# Patient Record
Sex: FEMALE | Race: ASIAN | NOT HISPANIC OR LATINO | Employment: FULL TIME | URBAN - METROPOLITAN AREA
[De-identification: names, ages, dates, MRNs, and addresses within clinical notes are randomized per-mention and may not be internally consistent; named-entity substitution may affect disease eponyms.]

---

## 2022-06-13 ENCOUNTER — OFFICE VISIT (OUTPATIENT)
Dept: URGENT CARE | Facility: PHYSICIAN GROUP | Age: 32
End: 2022-06-13
Payer: COMMERCIAL

## 2022-06-13 ENCOUNTER — HOSPITAL ENCOUNTER (OUTPATIENT)
Facility: MEDICAL CENTER | Age: 32
End: 2022-06-13
Attending: PHYSICIAN ASSISTANT
Payer: COMMERCIAL

## 2022-06-13 VITALS
HEART RATE: 80 BPM | HEIGHT: 60 IN | RESPIRATION RATE: 16 BRPM | OXYGEN SATURATION: 97 % | TEMPERATURE: 97.7 F | BODY MASS INDEX: 26.5 KG/M2 | DIASTOLIC BLOOD PRESSURE: 70 MMHG | WEIGHT: 135 LBS | SYSTOLIC BLOOD PRESSURE: 108 MMHG

## 2022-06-13 DIAGNOSIS — N30.01 ACUTE CYSTITIS WITH HEMATURIA: ICD-10-CM

## 2022-06-13 LAB
APPEARANCE UR: CLEAR
BILIRUB UR STRIP-MCNC: NEGATIVE MG/DL
COLOR UR AUTO: NORMAL
GLUCOSE UR STRIP.AUTO-MCNC: NEGATIVE MG/DL
KETONES UR STRIP.AUTO-MCNC: NEGATIVE MG/DL
LEUKOCYTE ESTERASE UR QL STRIP.AUTO: NORMAL
NITRITE UR QL STRIP.AUTO: NEGATIVE
PH UR STRIP.AUTO: 7 [PH] (ref 5–8)
PROT UR QL STRIP: NEGATIVE MG/DL
RBC UR QL AUTO: NORMAL
SP GR UR STRIP.AUTO: 0.01
UROBILINOGEN UR STRIP-MCNC: 0.2 MG/DL

## 2022-06-13 PROCEDURE — 99203 OFFICE O/P NEW LOW 30 MIN: CPT | Performed by: PHYSICIAN ASSISTANT

## 2022-06-13 PROCEDURE — 81002 URINALYSIS NONAUTO W/O SCOPE: CPT | Performed by: PHYSICIAN ASSISTANT

## 2022-06-13 PROCEDURE — 87077 CULTURE AEROBIC IDENTIFY: CPT

## 2022-06-13 PROCEDURE — 87186 SC STD MICRODIL/AGAR DIL: CPT

## 2022-06-13 PROCEDURE — 87086 URINE CULTURE/COLONY COUNT: CPT

## 2022-06-13 RX ORDER — NITROFURANTOIN 25; 75 MG/1; MG/1
100 CAPSULE ORAL 2 TIMES DAILY
Qty: 10 CAPSULE | Refills: 0 | Status: SHIPPED | OUTPATIENT
Start: 2022-06-13 | End: 2022-06-18

## 2022-06-14 DIAGNOSIS — N30.01 ACUTE CYSTITIS WITH HEMATURIA: ICD-10-CM

## 2022-06-14 ASSESSMENT — ENCOUNTER SYMPTOMS
VOMITING: 0
DIARRHEA: 0
FEVER: 0
COUGH: 0
SHORTNESS OF BREATH: 0
CHILLS: 0
MYALGIAS: 0
ABDOMINAL PAIN: 0
CONSTIPATION: 0
EYE PAIN: 0
NAUSEA: 0
HEADACHES: 0
SORE THROAT: 0

## 2022-06-14 NOTE — PROGRESS NOTES
Subjective:   Christine Wilks is a 32 y.o. female who presents for UTI (X 4 days)      ELMIRA  Is a pleasant 32-year-old female who has a 4-day history of dysuria, frequency and urgency.  These are typical symptoms for her with a urinary tract infection.  She is not noticed any discharge, bleeding, rash  Review of Systems   Constitutional: Negative for chills and fever.   HENT: Negative for congestion, ear pain and sore throat.    Eyes: Negative for pain.   Respiratory: Negative for cough and shortness of breath.    Cardiovascular: Negative for chest pain.   Gastrointestinal: Negative for abdominal pain, constipation, diarrhea, nausea and vomiting.   Genitourinary: Positive for dysuria, frequency and urgency.   Musculoskeletal: Negative for myalgias.   Skin: Negative for rash.   Neurological: Negative for headaches.       Medications, Allergies, and current problem list reviewed today in Epic.     Objective:     /70 (BP Location: Left arm, Patient Position: Sitting, BP Cuff Size: Adult)   Pulse 80   Temp 36.5 °C (97.7 °F) (Temporal)   Resp 16   Ht 1.524 m (5')   Wt 61.2 kg (135 lb)   SpO2 97%     Physical Exam  Vitals reviewed.   Constitutional:       Appearance: Normal appearance.   HENT:      Head: Normocephalic and atraumatic.      Right Ear: External ear normal.      Left Ear: External ear normal.      Nose: Nose normal.      Mouth/Throat:      Mouth: Mucous membranes are moist.   Eyes:      Conjunctiva/sclera: Conjunctivae normal.   Cardiovascular:      Rate and Rhythm: Normal rate.   Pulmonary:      Effort: Pulmonary effort is normal.   Abdominal:      Tenderness: There is no right CVA tenderness or left CVA tenderness.   Skin:     General: Skin is warm and dry.      Capillary Refill: Capillary refill takes less than 2 seconds.   Neurological:      Mental Status: She is alert and oriented to person, place, and time.         Lab Results/POC Test Results   Results for orders placed or performed  in visit on 06/13/22   POCT Urinalysis   Result Value Ref Range    POC Color Light Yellow Negative    POC Appearance Clear Negative    POC Leukocyte Esterase Small Negative    POC Nitrites Negative Negative    POC Urobiligen 0.2 Negative (0.2) mg/dL    POC Protein Negative Negative mg/dL    POC Urine PH 7.0 5.0 - 8.0    POC Blood Trace-Intact Negative    POC Specific Gravity 0.015 <1.005 - >1.030    POC Ketones Negative Negative mg/dL    POC Bilirubin Negative Negative mg/dL    POC Glucose Negative Negative mg/dL           Assessment/Plan:     Diagnosis and associated orders:     1. Acute cystitis with hematuria  POCT Urinalysis    URINE CULTURE(NEW)    nitrofurantoin (MACROBID) 100 MG Cap      Comments/MDM:     • No signs of urinary tract infection.  Appears to be a noncomplicated lower urinary tract infection she is a good candidate for Macrobid.  We will send out urine culture and contact the patient only if we have indication to alter antimicrobial therapy.  • Additionally I have considered several alternative diagnosis for dysuria including but not limited to urethritis, skin condition (irritant contact dermatitis, herpes), vaginitis, interstitial cystitis, and pyelonephritis.  •          Differential diagnosis, natural history, supportive care, and indications for immediate follow-up discussed.    Advised the patient to follow-up with the primary care physician for recheck, reevaluation, and consideration of further management.    Please note that this dictation was created using voice recognition software. I have made a reasonable attempt to correct obvious errors, but I expect that there are errors of grammar and possibly content that I did not discover before finalizing the note.    This note was electronically signed by Sukhwinder Morris PA-C

## 2022-06-16 LAB
BACTERIA UR CULT: ABNORMAL
BACTERIA UR CULT: ABNORMAL
SIGNIFICANT IND 70042: ABNORMAL
SITE SITE: ABNORMAL
SOURCE SOURCE: ABNORMAL

## 2023-10-23 ENCOUNTER — INITIAL PRENATAL (OUTPATIENT)
Dept: OBGYN | Facility: CLINIC | Age: 33
End: 2023-10-23
Payer: COMMERCIAL

## 2023-10-23 VITALS — SYSTOLIC BLOOD PRESSURE: 127 MMHG | WEIGHT: 163 LBS | BODY MASS INDEX: 31.83 KG/M2 | DIASTOLIC BLOOD PRESSURE: 72 MMHG

## 2023-10-23 DIAGNOSIS — Z34.92 SECOND TRIMESTER PREGNANCY: ICD-10-CM

## 2023-10-23 DIAGNOSIS — Z34.92 NORMAL PREGNANCY, SECOND TRIMESTER: ICD-10-CM

## 2023-10-23 PROCEDURE — 0500F INITIAL PRENATAL CARE VISIT: CPT | Performed by: OBSTETRICS & GYNECOLOGY

## 2023-10-23 PROCEDURE — 3078F DIAST BP <80 MM HG: CPT | Performed by: OBSTETRICS & GYNECOLOGY

## 2023-10-23 PROCEDURE — 3074F SYST BP LT 130 MM HG: CPT | Performed by: OBSTETRICS & GYNECOLOGY

## 2023-10-23 NOTE — PROGRESS NOTES
CC: New Ob visit    Subjective Ms. Christine Wilks  23w6d.     Ultrasound fone on 23 demonstrated 9w6d with KASHIF of 24.   LMP sometime early May.   Pap done in  and WNL. States she has had labs done.   23w6d today.     She denies h/o oral or genital HSV herself or her sexual partner.     denies personal or FOB family history of genetic abnormalities, congenital abnormalities or mental retardation.  reports personal history of chickenpox or varicella immunization.  denies cats at home.  Does not work with children.    ROS:  gen: denies general concerns  CV/resp: denies history of cardiac/respiratory issues.  abd: denies nausea/vomiting.  Denies abd pain.  GYN: reports fetal movement, rdenies vaginal bleeding, denies leakage of fluid, denies contractions.       I have reviewed the patients' medical, surgical, gynecological, obstetrical, social, and family histories, medications and available lab results and pertinent notes are as follows:    OB History    Para Term  AB Living   1 0 0 0 0 0   SAB IAB Ectopic Molar Multiple Live Births   0 0 0 0 0 0       Past Gynecological History:    Denies fibroids, ovarian cysts, abnormal pap smears, STDs. She denies ever having surgery on her cervix, uterus, or ovaries in the past. Last pap smear was      History reviewed. No pertinent past medical history.    History reviewed. No pertinent surgical history.    Social History     Socioeconomic History    Marital status: Single     Spouse name: Not on file    Number of children: Not on file    Years of education: Not on file    Highest education level: Not on file   Occupational History    Not on file   Tobacco Use    Smoking status: Never    Smokeless tobacco: Never   Vaping Use    Vaping Use: Never used   Substance and Sexual Activity    Alcohol use: Not Currently     Alcohol/week: 0.6 oz     Types: 1 Shots of liquor per week    Drug use: Never    Sexual activity: Yes     Partners: Male    Other Topics Concern    Not on file   Social History Narrative    Not on file     Social Determinants of Health     Financial Resource Strain: Not on file   Food Insecurity: Not on file   Transportation Needs: Not on file   Physical Activity: Not on file   Stress: Not on file   Social Connections: Not on file   Intimate Partner Violence: Not on file   Housing Stability: Not on file       Family History:   Family History   Problem Relation Age of Onset    Cancer Mother     Cancer Maternal Aunt     Diabetes Paternal Aunt        Genetic Screening/Teratology Counseling- Includes patient, baby's father, or anyone in either family with:  Patient's age 35 years or older as of estimated date of delivery: No     Thalassemia (Italian, Greek, Mediterranean, or  background): MCV less than 80: No     Neural tube defect (Meningomyelocele, Spina bifida, or Anencephaly): No     Congenital heart defect: No     Down syndrome: No     Tobi-Sachs (Ashkenazi Episcopal, Cajun, Chinese Kuwaiti): No     Canavan disease (Ashkenazi Episcopal): No     Familial dysautonomia (Ashkenazi Episcopal): No     Sickle cell disease or trait (): No     Hemophilia or other blood disorders: No     Muscular dystrophy: No    Cystic fibrosis: No     [place]'s chorea: No     Mental retardation/autism: No     Other inherited genetic or chromosomal disorder: No     Maternal metabolic disorder (eg. Type 1 diabetes, PKU): No     Patient or baby's father had child with birth defects not listed above: No     Recurrent pregnancy loss, or a stillbirth: No     Medications (including supplements, vitamins, herbs, or OTC drugs)/illicit/recreational drugs/alcohol since last menstrual period: No               Infection Prevention  1. High Risk For HIV: No 6. Rash Or Illness Since LMP: No     2. High Risk For Hepatitis B or C: No 7. History Of STD, GC, Chlamydia, HPV Syphilis: No     3. Live With Someone With TB Or Exposed To TB: No 8. Have a cat in the home?: No      4. Patient Or Partner Has A History Of Herpes: No 8a. Responsible for changing the litter?: No     5. History of Chicken Pox: Yes 9. Other (See Comments Below): No            Allergies: Patient has no known allergies.    Objective:/72   Wt 163 lb     Objective:   Vitals:    10/23/23 0901   BP: 127/72       Prenatal Physical:  General Exam:  HEENT: normal    Heart: normal    Thyroid: normal    Lungs: normal    Lymph Nodes: normal    Neurological: normal    Abdomen: normal    Skin: normal    Extremities: normal    Pelvic Exam:      Lab: No results found for this or any previous visit (from the past 672 hour(s)).    Ultrasound: Reviewed initial scan and KASHIF is by US    Assessment:    --Normal Exam at 23 weeks- measuring 27 weeks by fundal height. Anatomy scan ordered today. GTT, CBC, and RPR ordered today.     Plan:    Reviewed the patients risk factors for this pregnancy and recommend the need for prenatal care    Genetic screening was discussed with literature on Prenatal Screening, Cystic Fibrosis, and SMA provided and the patient plans to - already did [id number]    Discuss importance of water intake, controlled caloric intake, eating 5 small meals throughout the day to maintain blood sugar and taking PNV    If has nausea, take Vitamin B6 25mg TID with doxylamine/Unisom 25mg at night    Discuss importance of exercise, as well as rest    Lab slip for Prenatal labs (if not already completed)    Discuss policy for OB care at [facility]     Follow up in 4 weeks for next visit

## 2023-10-23 NOTE — PROGRESS NOTES
Patient here for NEW OB *Dr. Cowart pt*  LMP= per pt early may   KASHIF= 2/12/24  GA= 23w6d  Last pap- per pt got it done 7/17/23 wnl   Phone number:[phone] (home)   Pharmacy verified

## 2023-11-15 ENCOUNTER — HOSPITAL ENCOUNTER (OUTPATIENT)
Dept: RADIOLOGY | Facility: MEDICAL CENTER | Age: 33
End: 2023-11-15
Attending: OBSTETRICS & GYNECOLOGY
Payer: COMMERCIAL

## 2023-11-15 DIAGNOSIS — Z34.92 SECOND TRIMESTER PREGNANCY: ICD-10-CM

## 2023-11-15 DIAGNOSIS — Z34.92 NORMAL PREGNANCY, SECOND TRIMESTER: ICD-10-CM

## 2023-11-15 PROCEDURE — 76805 OB US >/= 14 WKS SNGL FETUS: CPT

## 2023-11-20 ENCOUNTER — ROUTINE PRENATAL (OUTPATIENT)
Dept: OBGYN | Facility: CLINIC | Age: 33
End: 2023-11-20
Payer: COMMERCIAL

## 2023-11-20 VITALS — WEIGHT: 170 LBS | DIASTOLIC BLOOD PRESSURE: 75 MMHG | SYSTOLIC BLOOD PRESSURE: 124 MMHG | BODY MASS INDEX: 33.2 KG/M2

## 2023-11-20 DIAGNOSIS — Z34.03 PRIMIGRAVIDA IN THIRD TRIMESTER: Primary | ICD-10-CM

## 2023-11-20 PROCEDURE — 3074F SYST BP LT 130 MM HG: CPT | Performed by: OBSTETRICS & GYNECOLOGY

## 2023-11-20 PROCEDURE — 0502F SUBSEQUENT PRENATAL CARE: CPT | Performed by: OBSTETRICS & GYNECOLOGY

## 2023-11-20 PROCEDURE — 3078F DIAST BP <80 MM HG: CPT | Performed by: OBSTETRICS & GYNECOLOGY

## 2023-11-20 NOTE — PROGRESS NOTES
Pt is here for an OB FV  Pt states +FM  No VB, LOF or UC's  K/C was given and explained  Tdap is declined today but will think about it for the next visit  Flu declined  Pt states she has insomnia and would like to know about supplementation

## 2023-11-20 NOTE — LETTER
"Count Your Baby's Movements  Another step to a healthy delivery  Christine Silvermanloyd             Dept: [phone]    How Many Weeks Pregnant? 27w6d    Date to Begin Countin23              How to use this chart    One way for your physician to keep track of your baby's health is by knowing how often the baby moves (or \"kicks\") in your womb.  You can help your physician to do this by using this chart every day.    Every day, you should see how many hours it takes for your baby to move 10 times.  Start in the morning, as soon as you get up.    First, write down the time your baby moves until you get to 10.  Check off one box every time your baby moves until you get to 10.  Write down the time you finished counting in the last column.  Total how long it took to count up all 10 movements.  Finally, fill in the box that shows how long this took.  After counting 10 movements, you no longer have to count any more that day.  The next morning, just start counting again as soon as you get up.    What should you call a \"movement\"?  It is hard to say, because it will feel different from one mother to another and from one pregnancy to the next.  The important thing is that you count the movements the same way throughout your pregnancy.  If you have more questions, you should ask your physician.    Count carefully every day!  SAMPLE:  Week 28    How many hours did it take to feel 10 movements?       Start  Time     1     2     3     4     5     6     7     8     9     10   Finish Time   Mon 8:20           11:40                  Fri               Sat               Sun                 IMPORTANT: You should contact your physician if it takes more than two hours for you to feel 10 movements.  Each morning, write down the time and start to count the movements of your baby.  Keep track by checking off one box every time you feel one movement.  When you have felt 10 \"kicks\", write down the " time you finished counting in the last column.  Then fill in the   box (over the check stanley) for the number of hours it took.  Be sure to read the complete instructions on the previous page.

## 2023-11-29 ENCOUNTER — HOSPITAL ENCOUNTER (OUTPATIENT)
Dept: LAB | Facility: MEDICAL CENTER | Age: 33
End: 2023-11-29
Attending: OBSTETRICS & GYNECOLOGY
Payer: COMMERCIAL

## 2023-11-29 DIAGNOSIS — Z34.92 NORMAL PREGNANCY, SECOND TRIMESTER: ICD-10-CM

## 2023-11-29 LAB
BASOPHILS # BLD AUTO: 0.5 % (ref 0–1.8)
BASOPHILS # BLD: 0.06 K/UL (ref 0–0.12)
EOSINOPHIL # BLD AUTO: 0.11 K/UL (ref 0–0.51)
EOSINOPHIL NFR BLD: 0.9 % (ref 0–6.9)
ERYTHROCYTE [DISTWIDTH] IN BLOOD BY AUTOMATED COUNT: 44 FL (ref 35.9–50)
HCT VFR BLD AUTO: 38.9 % (ref 37–47)
HGB BLD-MCNC: 13.1 G/DL (ref 12–16)
IMM GRANULOCYTES # BLD AUTO: 0.27 K/UL (ref 0–0.11)
IMM GRANULOCYTES NFR BLD AUTO: 2.1 % (ref 0–0.9)
LYMPHOCYTES # BLD AUTO: 1.78 K/UL (ref 1–4.8)
LYMPHOCYTES NFR BLD: 13.9 % (ref 22–41)
MCH RBC QN AUTO: 31.3 PG (ref 27–33)
MCHC RBC AUTO-ENTMCNC: 33.7 G/DL (ref 32.2–35.5)
MCV RBC AUTO: 93.1 FL (ref 81.4–97.8)
MONOCYTES # BLD AUTO: 1.02 K/UL (ref 0–0.85)
MONOCYTES NFR BLD AUTO: 8 % (ref 0–13.4)
NEUTROPHILS # BLD AUTO: 9.58 K/UL (ref 1.82–7.42)
NEUTROPHILS NFR BLD: 74.6 % (ref 44–72)
NRBC # BLD AUTO: 0 K/UL
NRBC BLD-RTO: 0 /100 WBC (ref 0–0.2)
PLATELET # BLD AUTO: 251 K/UL (ref 164–446)
PMV BLD AUTO: 10.2 FL (ref 9–12.9)
RBC # BLD AUTO: 4.18 M/UL (ref 4.2–5.4)
WBC # BLD AUTO: 12.8 K/UL (ref 4.8–10.8)

## 2023-11-29 PROCEDURE — 85025 COMPLETE CBC W/AUTO DIFF WBC: CPT

## 2023-11-29 PROCEDURE — 82950 GLUCOSE TEST: CPT

## 2023-11-29 PROCEDURE — 36415 COLL VENOUS BLD VENIPUNCTURE: CPT

## 2023-11-29 PROCEDURE — 86780 TREPONEMA PALLIDUM: CPT

## 2023-11-30 LAB
GLUCOSE 1H P 50 G GLC PO SERPL-MCNC: 104 MG/DL (ref 70–139)
T PALLIDUM AB SER QL IA: NORMAL

## 2023-12-07 ENCOUNTER — ROUTINE PRENATAL (OUTPATIENT)
Dept: OBGYN | Facility: CLINIC | Age: 33
End: 2023-12-07
Payer: COMMERCIAL

## 2023-12-07 VITALS — BODY MASS INDEX: 33.73 KG/M2 | WEIGHT: 172.7 LBS | DIASTOLIC BLOOD PRESSURE: 80 MMHG | SYSTOLIC BLOOD PRESSURE: 138 MMHG

## 2023-12-07 DIAGNOSIS — Z34.93 THIRD TRIMESTER PREGNANCY: ICD-10-CM

## 2023-12-07 PROCEDURE — 3075F SYST BP GE 130 - 139MM HG: CPT | Performed by: OBSTETRICS & GYNECOLOGY

## 2023-12-07 PROCEDURE — 3079F DIAST BP 80-89 MM HG: CPT | Performed by: OBSTETRICS & GYNECOLOGY

## 2023-12-07 PROCEDURE — 0502F SUBSEQUENT PRENATAL CARE: CPT | Performed by: OBSTETRICS & GYNECOLOGY

## 2023-12-07 NOTE — PROGRESS NOTES
Pt's here for OB follow-up  Reports + fetal movement good/active  No VB, LOF or UC's.  Confirmed good ph#, pharmacy, drug allergy, and medications on file.  Pt states no complaints for today.  Pt declines Influenza & TDAP this pregnancy.

## 2023-12-21 ENCOUNTER — HOSPITAL ENCOUNTER (EMERGENCY)
Facility: MEDICAL CENTER | Age: 33
End: 2023-12-21
Attending: OBSTETRICS & GYNECOLOGY | Admitting: OBSTETRICS & GYNECOLOGY
Payer: COMMERCIAL

## 2023-12-21 VITALS
TEMPERATURE: 97.4 F | DIASTOLIC BLOOD PRESSURE: 75 MMHG | SYSTOLIC BLOOD PRESSURE: 122 MMHG | OXYGEN SATURATION: 97 % | HEART RATE: 93 BPM

## 2023-12-21 LAB
APPEARANCE UR: CLEAR
COLOR UR AUTO: YELLOW
CRYSTALS AMN MICRO: NORMAL
GLUCOSE UR QL STRIP.AUTO: NEGATIVE MG/DL
KETONES UR QL STRIP.AUTO: ABNORMAL MG/DL
LEUKOCYTE ESTERASE UR QL STRIP.AUTO: NEGATIVE
NITRITE UR QL STRIP.AUTO: NEGATIVE
PH UR STRIP.AUTO: 7 [PH] (ref 5–8)
PROT UR QL STRIP: NEGATIVE MG/DL
RBC UR QL AUTO: ABNORMAL
SP GR UR STRIP.AUTO: 1.01 (ref 1–1.03)

## 2023-12-21 PROCEDURE — 59025 FETAL NON-STRESS TEST: CPT

## 2023-12-21 PROCEDURE — 81002 URINALYSIS NONAUTO W/O SCOPE: CPT

## 2023-12-21 PROCEDURE — 99282 EMERGENCY DEPT VISIT SF MDM: CPT | Performed by: ADVANCED PRACTICE MIDWIFE

## 2023-12-21 PROCEDURE — 89060 EXAM SYNOVIAL FLUID CRYSTALS: CPT

## 2023-12-21 PROCEDURE — 99284 EMERGENCY DEPT VISIT MOD MDM: CPT

## 2023-12-21 ASSESSMENT — PAIN SCALES - GENERAL: PAINLEVEL: 0 - NO PAIN

## 2023-12-22 ENCOUNTER — ROUTINE PRENATAL (OUTPATIENT)
Dept: OBGYN | Facility: CLINIC | Age: 33
End: 2023-12-22
Payer: COMMERCIAL

## 2023-12-22 VITALS — WEIGHT: 176 LBS | DIASTOLIC BLOOD PRESSURE: 81 MMHG | BODY MASS INDEX: 34.37 KG/M2 | SYSTOLIC BLOOD PRESSURE: 131 MMHG

## 2023-12-22 DIAGNOSIS — Z34.03 ENCOUNTER FOR SUPERVISION OF NORMAL FIRST PREGNANCY, THIRD TRIMESTER: ICD-10-CM

## 2023-12-22 PROCEDURE — 3079F DIAST BP 80-89 MM HG: CPT | Performed by: PHYSICIAN ASSISTANT

## 2023-12-22 PROCEDURE — 3075F SYST BP GE 130 - 139MM HG: CPT | Performed by: PHYSICIAN ASSISTANT

## 2023-12-22 PROCEDURE — 0502F SUBSEQUENT PRENATAL CARE: CPT | Performed by: PHYSICIAN ASSISTANT

## 2023-12-22 NOTE — PROGRESS NOTES
S: 33 y.o.  at 32w3d presents for routine obstetric follow-up.   Good fetal movement.  No contractions, vaginal bleeding, or leakage of fluid.    Questions answered.     Pt went to L&D yesterday due to leaking fluids. Lesli neg. Discussed s/sx if ruptured membranes.     O: /81   Wt 176 lb   BMI 34.37 kg/m²   Patients' weight gain, fluid intake and exercise level discussed.  Vitals, fundal height , fetal position, and FHR reviewed on flowsheet    TDaP: Declines, education provided  Flu: Declines, education provided  RSV: Educated on RSV vaccine at 32-36w  Rh: positive    A/P:  33 y.o.  at 32w3d presents for routine obstetric follow-up.  Size equals dates and/or scan    - Continue prenatal vitamins- pills not gummies.  - Fetal kick counts.  - Exercise at least 30 minutes daily. Drink at least 3-4L of water daily  - PTL precautions educated.    Follow-up in 2 weeks.    Sunshine Russell P.A.-C.  [facility]

## 2023-12-22 NOTE — PROGRESS NOTES
- Pt is a  with an EDC of 2/13 making her 32w2d today, with c/o LOF at 1800 that was clear following her shower and trickled down her leg. - VB, +LOF, +FM, - Uc's. VS taken, monitors applied x2, speculum exam performed noting white discharge and no gross pooling of amniotic fluid, fern collected and sent.  - report given to Anette SOUZA   - Anette SOUZA called and given update on pt fern result, provider to come to bedside shortly  - Anette SOUZA bedside  - Anette SOUZA gave this RN d/c orders   - d/c instructions reviewed with pt and FOB, pt acknowledges reasons to return and feels comfortable going home at this time

## 2023-12-22 NOTE — ED PROVIDER NOTES
Emergency Obstetric Consultation     Date of Service  2023    Reason for Consultation  Chief Complaint   Patient presents with    Rupture of Membranes       History of Presenting Illness  33 y.o. female who presented 2023 with Reason for consult: Patient presents to OB ED for complaints of leaking fluid. She reports that she was showering and drying off when she noticed a trickle down her leg. This happened again. When she wiped she did note that the fluid was clear and without odor.  She did have intercourse last evening and withdrawal method was not used. Denies presence of bleeding.     She has not had abdominal pain outside of three days ago. During that time, the pain came in waves throughout the day but resolved spontaneously.   Fetal activity: Perceived fetal activity is normal.    Last perceived fetal movement was within the past hour.    Prenatal complications: no prenatal complications    .    Review of Systems  Review of Systems   All other systems reviewed and are negative.      Obstetric History    OB History    Para Term  AB Living   1             SAB IAB Ectopic Molar Multiple Live Births                    # Outcome Date GA Lbr Chalo/2nd Weight Sex Delivery Anes PTL Lv   1 Current                Gynecologic History  No LMP recorded. Patient is pregnant.    Medical History  No past medical history on file.    Surgical History   has no past surgical history on file.    Family History  family history includes Cancer in her maternal aunt and mother; Diabetes in her paternal aunt.    Social History   reports that she has never smoked. She has never used smokeless tobacco. She reports that she does not currently use alcohol after a past usage of about 0.6 oz of alcohol per week. She reports that she does not use drugs.    Medications  No medications prior to admission.       Allergies  No Known Allergies    Physical Exam  Maternal Exam:  Introitus: Normal vulva. Normal vagina.   "Ferning test: negative.   Nitrazine test: negative.  Baseline rate: 145.   Variability: moderate (6-25 bpm).    Fetal State Assessment: Category I - tracings are normal.  Genitourinary:     General: Normal vulva.         Laboratory               No results for input(s): \"NTPROBNP\" in the last 72 hours.           Assessment & Plan  Assessment:  Not in labor.   Membrane status: intact.   Fetal well-being: normal.   1. 34 yo  with IUP at 32.2 weeks  2. Cat I FHR tracing  3. Leaking fluid    Plan:  1. Fern negative.   2. UA with trace blood likely from intercourse. No nitrates so UTI unlikely. Ketones so patient to orally hydrate.     General prenatal education reviewed with patient including warning signs and symptoms. Encouraged travel to [place] this week after reassuring visit tomorrow in office.           AMALIA Martinez    "

## 2023-12-22 NOTE — DISCHARGE INSTRUCTIONS
Pre-term Labor (<37 weeks):  Call your physician or return to the hospital if:  You have painless regular contractions more than 4 in one hour.  Your water breaks (remember time and color).  You have menstrual-like cramps, a low dull backache or pressure in your pelvis or back.  Your baby does not move enough to complete the daily kick count (10 movements in 2 hours).  Your baby moves much less often than on the days before or you have not felt your baby move all day.  Please review the MEDICATION LIST section of your AFTER VISIT SUMMARY document.  Take your medication as prescribed    What are the signs or symptoms?  Signs of PROM and PPROM include:  A sudden gush of fluid from the vagina.  A slow leak of fluid from the vagina.  Your underwear being wet often.

## 2024-01-02 ENCOUNTER — ROUTINE PRENATAL (OUTPATIENT)
Dept: OBGYN | Facility: CLINIC | Age: 34
End: 2024-01-02
Payer: COMMERCIAL

## 2024-01-02 VITALS — SYSTOLIC BLOOD PRESSURE: 122 MMHG | DIASTOLIC BLOOD PRESSURE: 57 MMHG | BODY MASS INDEX: 34.57 KG/M2 | WEIGHT: 177 LBS

## 2024-01-02 DIAGNOSIS — Z34.03 ENCOUNTER FOR SUPERVISION OF NORMAL FIRST PREGNANCY, THIRD TRIMESTER: ICD-10-CM

## 2024-01-02 PROCEDURE — 3074F SYST BP LT 130 MM HG: CPT | Performed by: STUDENT IN AN ORGANIZED HEALTH CARE EDUCATION/TRAINING PROGRAM

## 2024-01-02 PROCEDURE — 0502F SUBSEQUENT PRENATAL CARE: CPT | Performed by: STUDENT IN AN ORGANIZED HEALTH CARE EDUCATION/TRAINING PROGRAM

## 2024-01-02 PROCEDURE — 3078F DIAST BP <80 MM HG: CPT | Performed by: STUDENT IN AN ORGANIZED HEALTH CARE EDUCATION/TRAINING PROGRAM

## 2024-01-02 NOTE — PROGRESS NOTES
OB F/U  PT REPORTS + FETAL MOVEMENT  DENIES VB, LOF, OR UC'S  PHONE # VERIFIED  PHARMACY VERIFIED    PT REPORTS SPOTTING 1 TIME LAST WEEK SINCE THEN HAS NOT BLED  PT ALSO REPORTS ONGOING NUMBNESS AND TINGLING IN HANDS AND FINGERS (LEFT HAND WORSE)

## 2024-01-02 NOTE — PROGRESS NOTES
S: 33 y.o.  at 34w0d presents for routine obstetric follow-up.   Good fetal movement.  States had one episode of spotting last week. This has since resolved. Also, complaining of numbness and tingling to bilateral hands with her left worse than her right.   No contractions, vaginal bleeding, or leakage of fluid.    Questions answered.    O: There were no vitals taken for this visit.  Patients' weight gain, fluid intake and exercise level discussed.  Vitals, fundal height , fetal position, and FHR reviewed on flowsheet    Lab:  Recent Results (from the past 336 hour(s))   Ferning if suspected rupture of membranes (ROM)    Collection Time: 23  7:05 PM   Result Value Ref Range    Fern Test On Amniotic Fluid see below Not present   POCT urinalysis device results    Collection Time: 23  7:53 PM   Result Value Ref Range    POC Color Yellow     POC Appearance Clear     POC Glucose Negative Negative mg/dL    POC Ketones Trace (A) Negative mg/dL    POC Specific Gravity 1.010 1.005 - 1.030    POC Blood Trace-intact (A) Negative    POC Urine PH 7.0 5.0 - 8.0    POC Protein Negative Negative mg/dL    POC Nitrites Negative Negative    POC Leukocyte Esterase Negative Negative     Recent US: 11/15/2023 YASMANY: 10.2. Cervical length: 3.30. EFW: 45%; 1058g.  TDaP: Declines, education provided  Flu: Declines, education provided  RSV: Educated on RSV vaccine at 32-36w  GBS: collect at 36 weeks  Rh: positive (o positive based on records found in media)    A/P:  33 y.o.  at 34w0d presents for routine obstetric follow-up.  Size equals dates and/or scan    - GBS at next visit  - Bilateral numbness/tingling (L>R). Recommended wrist braces especially at night  - Continue prenatal vitamins- pills not gummies.  - Fetal kick counts.  - Exercise at least 30 minutes daily. Drink at least 3-4L of water daily  - PTL precautions educated.    Follow-up in 2 weeks.    Devika Manley P.A.-C.  [facility]

## 2024-01-16 ENCOUNTER — ROUTINE PRENATAL (OUTPATIENT)
Dept: OBGYN | Facility: CLINIC | Age: 34
End: 2024-01-16
Payer: COMMERCIAL

## 2024-01-16 ENCOUNTER — HOSPITAL ENCOUNTER (OUTPATIENT)
Facility: MEDICAL CENTER | Age: 34
End: 2024-01-16
Attending: OBSTETRICS & GYNECOLOGY
Payer: COMMERCIAL

## 2024-01-16 ENCOUNTER — APPOINTMENT (OUTPATIENT)
Dept: OBGYN | Facility: CLINIC | Age: 34
End: 2024-01-16
Payer: COMMERCIAL

## 2024-01-16 VITALS — BODY MASS INDEX: 35.56 KG/M2 | WEIGHT: 182.1 LBS | DIASTOLIC BLOOD PRESSURE: 77 MMHG | SYSTOLIC BLOOD PRESSURE: 104 MMHG

## 2024-01-16 DIAGNOSIS — Z34.93 THIRD TRIMESTER PREGNANCY: ICD-10-CM

## 2024-01-16 PROCEDURE — 87081 CULTURE SCREEN ONLY: CPT

## 2024-01-16 PROCEDURE — 0502F SUBSEQUENT PRENATAL CARE: CPT | Performed by: OBSTETRICS & GYNECOLOGY

## 2024-01-16 PROCEDURE — 3074F SYST BP LT 130 MM HG: CPT | Performed by: OBSTETRICS & GYNECOLOGY

## 2024-01-16 PROCEDURE — 3078F DIAST BP <80 MM HG: CPT | Performed by: OBSTETRICS & GYNECOLOGY

## 2024-01-16 PROCEDURE — 87150 DNA/RNA AMPLIFIED PROBE: CPT

## 2024-01-17 LAB — GP B STREP DNA SPEC QL NAA+PROBE: NEGATIVE

## 2024-01-25 ENCOUNTER — ROUTINE PRENATAL (OUTPATIENT)
Dept: OBGYN | Facility: CLINIC | Age: 34
End: 2024-01-25
Payer: COMMERCIAL

## 2024-01-25 VITALS — WEIGHT: 183 LBS | DIASTOLIC BLOOD PRESSURE: 71 MMHG | SYSTOLIC BLOOD PRESSURE: 114 MMHG | BODY MASS INDEX: 35.74 KG/M2

## 2024-01-25 DIAGNOSIS — Z34.03 PRIMIGRAVIDA IN THIRD TRIMESTER: Primary | ICD-10-CM

## 2024-01-25 PROCEDURE — 3074F SYST BP LT 130 MM HG: CPT | Performed by: OBSTETRICS & GYNECOLOGY

## 2024-01-25 PROCEDURE — 0502F SUBSEQUENT PRENATAL CARE: CPT | Performed by: OBSTETRICS & GYNECOLOGY

## 2024-01-25 PROCEDURE — 3078F DIAST BP <80 MM HG: CPT | Performed by: OBSTETRICS & GYNECOLOGY

## 2024-02-01 ENCOUNTER — ROUTINE PRENATAL (OUTPATIENT)
Dept: OBGYN | Facility: CLINIC | Age: 34
End: 2024-02-01
Payer: COMMERCIAL

## 2024-02-01 VITALS — SYSTOLIC BLOOD PRESSURE: 125 MMHG | WEIGHT: 185 LBS | DIASTOLIC BLOOD PRESSURE: 80 MMHG | BODY MASS INDEX: 36.13 KG/M2

## 2024-02-01 DIAGNOSIS — Z34.03 PRIMIGRAVIDA IN THIRD TRIMESTER: Primary | ICD-10-CM

## 2024-02-01 PROCEDURE — 3079F DIAST BP 80-89 MM HG: CPT | Performed by: OBSTETRICS & GYNECOLOGY

## 2024-02-01 PROCEDURE — 3074F SYST BP LT 130 MM HG: CPT | Performed by: OBSTETRICS & GYNECOLOGY

## 2024-02-01 PROCEDURE — 0502F SUBSEQUENT PRENATAL CARE: CPT | Performed by: OBSTETRICS & GYNECOLOGY

## 2024-02-06 ENCOUNTER — ROUTINE PRENATAL (OUTPATIENT)
Dept: OBGYN | Facility: CLINIC | Age: 34
End: 2024-02-06
Payer: COMMERCIAL

## 2024-02-06 VITALS — BODY MASS INDEX: 35.94 KG/M2 | DIASTOLIC BLOOD PRESSURE: 80 MMHG | SYSTOLIC BLOOD PRESSURE: 119 MMHG | WEIGHT: 184 LBS

## 2024-02-06 DIAGNOSIS — Z34.03 PRIMIGRAVIDA IN THIRD TRIMESTER: ICD-10-CM

## 2024-02-06 PROCEDURE — 3079F DIAST BP 80-89 MM HG: CPT | Performed by: STUDENT IN AN ORGANIZED HEALTH CARE EDUCATION/TRAINING PROGRAM

## 2024-02-06 PROCEDURE — 3074F SYST BP LT 130 MM HG: CPT | Performed by: STUDENT IN AN ORGANIZED HEALTH CARE EDUCATION/TRAINING PROGRAM

## 2024-02-06 PROCEDURE — 0502F SUBSEQUENT PRENATAL CARE: CPT | Performed by: STUDENT IN AN ORGANIZED HEALTH CARE EDUCATION/TRAINING PROGRAM

## 2024-02-06 NOTE — PROGRESS NOTES
OB F/U  PT REPORTS + FETAL MOVEMENT  DENIES VB, LOF  PHONE # VERIFIED  PHARMACY VERIFIED    PT REPORTS CONTRACTIONS ONSET LAST NIGHT  CERVICAL CHECK TODAY

## 2024-02-06 NOTE — PROGRESS NOTES
S: 34 y.o.  at 39w0d presents for routine obstetric follow-up.   Good fetal movement.  Did have contractions last night for a few hours. These subsided after going to bed. Mild yellow discharge this morning.  No vaginal bleeding, or leakage of fluid.    Questions answered.    O: There were no vitals taken for this visit.  Patients' weight gain, fluid intake and exercise level discussed.  Vitals, fundal height , fetal position, and FHR reviewed on flowsheet    Lab:No results found for this or any previous visit (from the past 336 hour(s)).  Recent US: 11/15/2023 YASMANY: 10.2. Cervical length: 3.30. EFW: 45%, 1058g.  TDaP: Declines, education provided  Flu: Declines, education provided  RSV: Educated on RSV vaccine at 32-36w  GBS: Negative   Rh: positive  BTL: declines    A/P:  34 y.o.  at 39w0d presents for routine obstetric follow-up.  Size equals dates and/or scan    - GBS negative  - : 3/50/-2  - Continue prenatal vitamins- pills not gummies.  - Fetal kick counts.  - Exercise at least 30 minutes daily. Drink at least 3-4L of water daily  - Labor precautions educated.    Follow-up in 1 weeks.    LIVAN Ramirez.[email or web address].  [facility]

## 2024-02-13 ENCOUNTER — HOSPITAL ENCOUNTER (EMERGENCY)
Facility: MEDICAL CENTER | Age: 34
End: 2024-02-13
Attending: FAMILY MEDICINE | Admitting: FAMILY MEDICINE
Payer: COMMERCIAL

## 2024-02-13 ENCOUNTER — ROUTINE PRENATAL (OUTPATIENT)
Dept: OBGYN | Facility: CLINIC | Age: 34
End: 2024-02-13
Payer: COMMERCIAL

## 2024-02-13 VITALS
WEIGHT: 187 LBS | RESPIRATION RATE: 20 BRPM | SYSTOLIC BLOOD PRESSURE: 112 MMHG | DIASTOLIC BLOOD PRESSURE: 64 MMHG | TEMPERATURE: 96 F | OXYGEN SATURATION: 98 % | BODY MASS INDEX: 36.52 KG/M2 | HEART RATE: 86 BPM

## 2024-02-13 VITALS — SYSTOLIC BLOOD PRESSURE: 131 MMHG | WEIGHT: 187 LBS | BODY MASS INDEX: 36.52 KG/M2 | DIASTOLIC BLOOD PRESSURE: 73 MMHG

## 2024-02-13 DIAGNOSIS — Z34.03 PRIMIGRAVIDA IN THIRD TRIMESTER: Primary | ICD-10-CM

## 2024-02-13 PROCEDURE — 3078F DIAST BP <80 MM HG: CPT | Performed by: OBSTETRICS & GYNECOLOGY

## 2024-02-13 PROCEDURE — 3075F SYST BP GE 130 - 139MM HG: CPT | Performed by: OBSTETRICS & GYNECOLOGY

## 2024-02-13 PROCEDURE — 59025 FETAL NON-STRESS TEST: CPT

## 2024-02-13 PROCEDURE — 99282 EMERGENCY DEPT VISIT SF MDM: CPT | Mod: GC,25 | Performed by: OBSTETRICS & GYNECOLOGY

## 2024-02-13 PROCEDURE — 59025 FETAL NON-STRESS TEST: CPT | Mod: 26 | Performed by: OBSTETRICS & GYNECOLOGY

## 2024-02-13 PROCEDURE — 0502F SUBSEQUENT PRENATAL CARE: CPT | Performed by: OBSTETRICS & GYNECOLOGY

## 2024-02-13 PROCEDURE — 99282 EMERGENCY DEPT VISIT SF MDM: CPT

## 2024-02-13 NOTE — PROGRESS NOTES
S: Pt presents for routine OB follow up. Good fetal movement. She had contractions about 20 min apart a few days ago. She reports some discharge this morning, but it's not continuous. Denies vaginal bleeding. Questions answered.    O: /73   Wt 187 lb   BMI 36.52 kg/m²   Patients' weight gain, fluid intake and exercise level discussed.  Vitals, fundal height , fetal position, and FHR reviewed on flowsheet    Lab:No results found for this or any previous visit (from the past 336 hour(s)).    A/P:  34 y.o.  at 40w0d presents for routine obstetric follow-up.  Size equals dates and/or scan    Diagnoses and all orders for this visit:    Primigravida in third trimester  -     Induction of Labor and ; Future    Vaginal discharge in pregnancy  - on SSE: no pooling, negative valsalva, negative nitrazine    IOL scheduled at 40w4d per patient request. Discussed risk of , but also discussed that we do not recommend continuing the pregnancy beyond 41 weeks. All questions answered.     Labor precautions discussed.     Ashley Mansfield M.D.

## 2024-02-14 NOTE — PROGRESS NOTES
1730-Pt here for contractions starting about 4pm. Pt states they are anywhere from 1-7mins apart. Pt denies any leaking or bleeding and reports + FM. Pt placed on monitors (us and toco) SVE 2-3/80/-2. POC discussed with pt. Pt denies any questions at this time.  at bedside.   1736-Report given to Dr Cabrera  1738-MD at bedside.   1835-Recheck SVE no change.  Pt report contractions much more mild now.   1839-Dr Willis updated. Discharge order received.   1845-Discharge instruction reviewed with pt. Labor precautions discussed. Pt denies any questions. Monitors (us and toco) removed.   1851-pt discharged home with

## 2024-02-14 NOTE — ED PROVIDER NOTES
LABOR AND DELIVERY TRIAGE NOTE    PATIENT ID:  NAME:  Christine Wilks  MRN:               [id number]  YOB: 1990     34 y.o. female  at 40w0d.    Subjective: Pt states presents due to contractions starting this afternoon. Reports they are between 1-5 minutes apart lasting up to 1 minute. She was seen in the office earlier today and was 1cm dilated, negative nitrazine at that time. She is scheduled for IOL on the .      positive for contractions  positive pain   negative for LOF  negative for vaginal bleeding  positive for fetal movement    PNL:  Blood Type O+, Rubella immune, HIV neg, TrepAb neg, HBsAg NR, GC/CT neg/neg  1 HR GTT: 104  GBS negative       ROS: Patient denies any fever chills, nausea, vomiting, headache, chest pain, shortness of breath, or dysuria or unusual swelling of hands or feet.     PMHx:   No past medical history on file.     OB History    Para Term  AB Living   1             SAB IAB Ectopic Molar Multiple Live Births                    # Outcome Date GA Lbr Chalo/2nd Weight Sex Delivery Anes PTL Lv   1 Current                GYN: denies STIs, no cervical procedures    PSHx:  No past surgical history on file.    Social Hx:  Social History     Tobacco Use    Smoking status: Never    Smokeless tobacco: Never   Vaping Use    Vaping Use: Never used   Substance Use Topics    Alcohol use: Not Currently     Alcohol/week: 0.6 oz     Types: 1 Shots of liquor per week    Drug use: Never         Medications:  No current facility-administered medications on file prior to encounter.     Current Outpatient Medications on File Prior to Encounter   Medication Sig Dispense Refill    Prenatal Vit-Fe Fumarate-FA (PRENATAL 1 PLUS 1 PO) Take  by mouth.         Allergies:  Patient has no known allergies.      Objective:    There were no vitals filed for this visit.  No data recorded.    General: No acute distress, resting comfortably in bed.  HEENT: normocephalic, nontraumatic,  PERRLA, EOMI  Cardiovascular: Heart RRR with no murmurs, rubs or gallops. Distal Pulses 2+  Respiratory: symmetric chest expansion, lungs CTAB, with no wheezes, rales, rhonci  Abdomen: gravid, nontender  Musculoskeletal: PADILLA spontaneously  Neuro: non focal with no numbness, tingling or changes in sensation    Cervix:  2-3cm/80%/-2  [place]: Uterine Contractions Q4-7 minutes.   FHRM: Baseline 135, mod variability, + accels, no decels    Labs: None    Assessment: 34 y.o. female  at 40w0d presents with contractions.    #SIUP @ 40w by 9w US  #Early labor   - Recheck cervix in 1hr, if no change then discharge home and have patient return on the 16th for her scheduled IOL or sooner if she has ROM or contractions that are more consistent and frequent   -Cont. Prenatal care with [facility]     #Fetal status   - FHT: cat I    #GBS status  -GBS: neg     #Rubella immune, HIV neg, TrepAb neg, HBsAg NR, GC/CT neg/neg      - Patient is cleared to return home with family. Encouraged to see MD/DO for increased painful uterine contractions @ 3-5, vaginal bleeding, loss of fluid, or other serious symptoms.      Discussed case with Dr. Willis, [facility] Attending. Case was discussed and attending agreed with plan.      Nitin Cabrera MD  PGY-1, [facility]

## 2024-02-15 ENCOUNTER — HOSPITAL ENCOUNTER (EMERGENCY)
Facility: MEDICAL CENTER | Age: 34
End: 2024-02-15
Attending: OBSTETRICS & GYNECOLOGY | Admitting: OBSTETRICS & GYNECOLOGY
Payer: COMMERCIAL

## 2024-02-15 VITALS
SYSTOLIC BLOOD PRESSURE: 131 MMHG | OXYGEN SATURATION: 98 % | TEMPERATURE: 96.3 F | HEART RATE: 95 BPM | WEIGHT: 187 LBS | RESPIRATION RATE: 19 BRPM | HEIGHT: 62 IN | BODY MASS INDEX: 34.41 KG/M2 | DIASTOLIC BLOOD PRESSURE: 87 MMHG

## 2024-02-15 PROCEDURE — 700102 HCHG RX REV CODE 250 W/ 637 OVERRIDE(OP)

## 2024-02-15 PROCEDURE — A9270 NON-COVERED ITEM OR SERVICE: HCPCS

## 2024-02-15 PROCEDURE — 96372 THER/PROPH/DIAG INJ SC/IM: CPT

## 2024-02-15 PROCEDURE — 700111 HCHG RX REV CODE 636 W/ 250 OVERRIDE (IP): Mod: JZ

## 2024-02-15 PROCEDURE — 99283 EMERGENCY DEPT VISIT LOW MDM: CPT

## 2024-02-15 PROCEDURE — 59025 FETAL NON-STRESS TEST: CPT

## 2024-02-15 RX ORDER — PROMETHAZINE HYDROCHLORIDE 25 MG/1
25 TABLET ORAL ONCE
Status: COMPLETED | OUTPATIENT
Start: 2024-02-15 | End: 2024-02-15

## 2024-02-15 RX ADMIN — MORPHINE SULFATE 5 MG: 10 INJECTION INTRAVENOUS at 21:19

## 2024-02-15 RX ADMIN — PROMETHAZINE HYDROCHLORIDE 25 MG: 25 TABLET ORAL at 21:19

## 2024-02-16 ENCOUNTER — ANESTHESIA (OUTPATIENT)
Dept: OBGYN | Facility: MEDICAL CENTER | Age: 34
End: 2024-02-16
Payer: COMMERCIAL

## 2024-02-16 ENCOUNTER — HOSPITAL ENCOUNTER (INPATIENT)
Facility: MEDICAL CENTER | Age: 34
LOS: 2 days | End: 2024-02-18
Attending: OBSTETRICS & GYNECOLOGY | Admitting: OBSTETRICS & GYNECOLOGY
Payer: COMMERCIAL

## 2024-02-16 ENCOUNTER — APPOINTMENT (OUTPATIENT)
Dept: OBGYN | Facility: MEDICAL CENTER | Age: 34
End: 2024-02-16
Attending: OBSTETRICS & GYNECOLOGY
Payer: COMMERCIAL

## 2024-02-16 ENCOUNTER — ANESTHESIA EVENT (OUTPATIENT)
Dept: OBGYN | Facility: MEDICAL CENTER | Age: 34
End: 2024-02-16
Payer: COMMERCIAL

## 2024-02-16 DIAGNOSIS — Z91.89 AT RISK FOR BREASTFEEDING DIFFICULTY: ICD-10-CM

## 2024-02-16 DIAGNOSIS — Z34.03 PRIMIGRAVIDA IN THIRD TRIMESTER: ICD-10-CM

## 2024-02-16 DIAGNOSIS — Z34.92 NORMAL PREGNANCY, SECOND TRIMESTER: ICD-10-CM

## 2024-02-16 LAB
BASOPHILS # BLD AUTO: 0.3 % (ref 0–1.8)
BASOPHILS # BLD: 0.07 K/UL (ref 0–0.12)
EOSINOPHIL # BLD AUTO: 0 K/UL (ref 0–0.51)
EOSINOPHIL NFR BLD: 0 % (ref 0–6.9)
ERYTHROCYTE [DISTWIDTH] IN BLOOD BY AUTOMATED COUNT: 42.7 FL (ref 35.9–50)
HCT VFR BLD AUTO: 41.6 % (ref 37–47)
HGB BLD-MCNC: 14.9 G/DL (ref 12–16)
HOLDING TUBE BB 8507: NORMAL
IMM GRANULOCYTES # BLD AUTO: 0.24 K/UL (ref 0–0.11)
IMM GRANULOCYTES NFR BLD AUTO: 1 % (ref 0–0.9)
LYMPHOCYTES # BLD AUTO: 0.84 K/UL (ref 1–4.8)
LYMPHOCYTES NFR BLD: 3.7 % (ref 22–41)
MCH RBC QN AUTO: 31.9 PG (ref 27–33)
MCHC RBC AUTO-ENTMCNC: 35.8 G/DL (ref 32.2–35.5)
MCV RBC AUTO: 89.1 FL (ref 81.4–97.8)
MONOCYTES # BLD AUTO: 0.86 K/UL (ref 0–0.85)
MONOCYTES NFR BLD AUTO: 3.8 % (ref 0–13.4)
NEUTROPHILS # BLD AUTO: 20.89 K/UL (ref 1.82–7.42)
NEUTROPHILS NFR BLD: 91.2 % (ref 44–72)
NRBC # BLD AUTO: 0 K/UL
NRBC BLD-RTO: 0 /100 WBC (ref 0–0.2)
PLATELET # BLD AUTO: 248 K/UL (ref 164–446)
PMV BLD AUTO: 10.9 FL (ref 9–12.9)
RBC # BLD AUTO: 4.67 M/UL (ref 4.2–5.4)
RPR SER QL: NON REACTIVE
T PALLIDUM AB SER QL IA: NORMAL
WBC # BLD AUTO: 22.9 K/UL (ref 4.8–10.8)

## 2024-02-16 PROCEDURE — 59510 CESAREAN DELIVERY: CPT | Performed by: OBSTETRICS & GYNECOLOGY

## 2024-02-16 PROCEDURE — 160035 HCHG PACU - 1ST 60 MINS PHASE I: Performed by: OBSTETRICS & GYNECOLOGY

## 2024-02-16 PROCEDURE — 303615 HCHG EPIDURAL/SPINAL ANESTHESIA FOR LABOR

## 2024-02-16 PROCEDURE — 700101 HCHG RX REV CODE 250: Performed by: ANESTHESIOLOGY

## 2024-02-16 PROCEDURE — 700111 HCHG RX REV CODE 636 W/ 250 OVERRIDE (IP): Mod: JZ | Performed by: OBSTETRICS & GYNECOLOGY

## 2024-02-16 PROCEDURE — 700105 HCHG RX REV CODE 258: Performed by: ANESTHESIOLOGY

## 2024-02-16 PROCEDURE — 770002 HCHG ROOM/CARE - OB PRIVATE (112)

## 2024-02-16 PROCEDURE — 160009 HCHG ANES TIME/MIN: Performed by: OBSTETRICS & GYNECOLOGY

## 2024-02-16 PROCEDURE — A9270 NON-COVERED ITEM OR SERVICE: HCPCS | Performed by: ANESTHESIOLOGY

## 2024-02-16 PROCEDURE — 700105 HCHG RX REV CODE 258: Performed by: OBSTETRICS & GYNECOLOGY

## 2024-02-16 PROCEDURE — 86592 SYPHILIS TEST NON-TREP QUAL: CPT

## 2024-02-16 PROCEDURE — 160041 HCHG SURGERY MINUTES - EA ADDL 1 MIN LEVEL 4: Performed by: OBSTETRICS & GYNECOLOGY

## 2024-02-16 PROCEDURE — 36415 COLL VENOUS BLD VENIPUNCTURE: CPT

## 2024-02-16 PROCEDURE — 86780 TREPONEMA PALLIDUM: CPT

## 2024-02-16 PROCEDURE — 700111 HCHG RX REV CODE 636 W/ 250 OVERRIDE (IP): Performed by: ANESTHESIOLOGY

## 2024-02-16 PROCEDURE — C1755 CATHETER, INTRASPINAL: HCPCS | Performed by: OBSTETRICS & GYNECOLOGY

## 2024-02-16 PROCEDURE — 160029 HCHG SURGERY MINUTES - 1ST 30 MINS LEVEL 4: Performed by: OBSTETRICS & GYNECOLOGY

## 2024-02-16 PROCEDURE — 85025 COMPLETE CBC W/AUTO DIFF WBC: CPT

## 2024-02-16 PROCEDURE — 59514 CESAREAN DELIVERY ONLY: CPT | Mod: AS | Performed by: NURSE PRACTITIONER

## 2024-02-16 PROCEDURE — 700111 HCHG RX REV CODE 636 W/ 250 OVERRIDE (IP): Mod: JZ

## 2024-02-16 PROCEDURE — 160048 HCHG OR STATISTICAL LEVEL 1-5: Performed by: OBSTETRICS & GYNECOLOGY

## 2024-02-16 PROCEDURE — 160002 HCHG RECOVERY MINUTES (STAT): Performed by: OBSTETRICS & GYNECOLOGY

## 2024-02-16 PROCEDURE — 700111 HCHG RX REV CODE 636 W/ 250 OVERRIDE (IP): Mod: JZ | Performed by: ANESTHESIOLOGY

## 2024-02-16 PROCEDURE — 700101 HCHG RX REV CODE 250: Performed by: OBSTETRICS & GYNECOLOGY

## 2024-02-16 PROCEDURE — 700102 HCHG RX REV CODE 250 W/ 637 OVERRIDE(OP): Performed by: ANESTHESIOLOGY

## 2024-02-16 RX ORDER — CEFAZOLIN SODIUM 1 G/3ML
INJECTION, POWDER, FOR SOLUTION INTRAMUSCULAR; INTRAVENOUS PRN
Status: DISCONTINUED | OUTPATIENT
Start: 2024-02-16 | End: 2024-02-16 | Stop reason: SURG

## 2024-02-16 RX ORDER — OXYTOCIN 10 [USP'U]/ML
10 INJECTION, SOLUTION INTRAMUSCULAR; INTRAVENOUS
Status: DISCONTINUED | OUTPATIENT
Start: 2024-02-16 | End: 2024-02-17 | Stop reason: HOSPADM

## 2024-02-16 RX ORDER — ONDANSETRON 2 MG/ML
4 INJECTION INTRAMUSCULAR; INTRAVENOUS
Status: DISCONTINUED | OUTPATIENT
Start: 2024-02-16 | End: 2024-02-17 | Stop reason: HOSPADM

## 2024-02-16 RX ORDER — HALOPERIDOL 5 MG/ML
1 INJECTION INTRAMUSCULAR
Status: DISCONTINUED | OUTPATIENT
Start: 2024-02-16 | End: 2024-02-17 | Stop reason: HOSPADM

## 2024-02-16 RX ORDER — ONDANSETRON 2 MG/ML
4 INJECTION INTRAMUSCULAR; INTRAVENOUS EVERY 6 HOURS PRN
Status: DISCONTINUED | OUTPATIENT
Start: 2024-02-16 | End: 2024-02-17 | Stop reason: HOSPADM

## 2024-02-16 RX ORDER — LIDOCAINE HCL/EPINEPHRINE/PF 2%-1:200K
VIAL (ML) INJECTION PRN
Status: DISCONTINUED | OUTPATIENT
Start: 2024-02-16 | End: 2024-02-16 | Stop reason: SURG

## 2024-02-16 RX ORDER — OXYTOCIN 10 [USP'U]/ML
INJECTION, SOLUTION INTRAMUSCULAR; INTRAVENOUS PRN
Status: DISCONTINUED | OUTPATIENT
Start: 2024-02-16 | End: 2024-02-16 | Stop reason: SURG

## 2024-02-16 RX ORDER — ROPIVACAINE HYDROCHLORIDE 2 MG/ML
INJECTION, SOLUTION EPIDURAL; INFILTRATION; PERINEURAL CONTINUOUS
Status: DISCONTINUED | OUTPATIENT
Start: 2024-02-16 | End: 2024-02-17 | Stop reason: HOSPADM

## 2024-02-16 RX ORDER — OXYCODONE HCL 5 MG/5 ML
10 SOLUTION, ORAL ORAL
Status: DISCONTINUED | OUTPATIENT
Start: 2024-02-16 | End: 2024-02-17 | Stop reason: HOSPADM

## 2024-02-16 RX ORDER — SODIUM CHLORIDE, SODIUM LACTATE, POTASSIUM CHLORIDE, AND CALCIUM CHLORIDE .6; .31; .03; .02 G/100ML; G/100ML; G/100ML; G/100ML
250 INJECTION, SOLUTION INTRAVENOUS PRN
Status: DISCONTINUED | OUTPATIENT
Start: 2024-02-16 | End: 2024-02-17 | Stop reason: HOSPADM

## 2024-02-16 RX ORDER — IBUPROFEN 800 MG/1
800 TABLET ORAL
Status: DISCONTINUED | OUTPATIENT
Start: 2024-02-16 | End: 2024-02-17 | Stop reason: HOSPADM

## 2024-02-16 RX ORDER — CITRIC ACID/SODIUM CITRATE 334-500MG
30 SOLUTION, ORAL ORAL ONCE
Status: COMPLETED | OUTPATIENT
Start: 2024-02-16 | End: 2024-02-16

## 2024-02-16 RX ORDER — METOCLOPRAMIDE HYDROCHLORIDE 5 MG/ML
10 INJECTION INTRAMUSCULAR; INTRAVENOUS ONCE
Status: COMPLETED | OUTPATIENT
Start: 2024-02-16 | End: 2024-02-16

## 2024-02-16 RX ORDER — OXYCODONE HCL 5 MG/5 ML
5 SOLUTION, ORAL ORAL
Status: DISCONTINUED | OUTPATIENT
Start: 2024-02-16 | End: 2024-02-17 | Stop reason: HOSPADM

## 2024-02-16 RX ORDER — SODIUM CHLORIDE, SODIUM LACTATE, POTASSIUM CHLORIDE, AND CALCIUM CHLORIDE .6; .31; .03; .02 G/100ML; G/100ML; G/100ML; G/100ML
1000 INJECTION, SOLUTION INTRAVENOUS
Status: COMPLETED | OUTPATIENT
Start: 2024-02-16 | End: 2024-02-16

## 2024-02-16 RX ORDER — KETOROLAC TROMETHAMINE 30 MG/ML
INJECTION, SOLUTION INTRAMUSCULAR; INTRAVENOUS PRN
Status: DISCONTINUED | OUTPATIENT
Start: 2024-02-16 | End: 2024-02-16 | Stop reason: SURG

## 2024-02-16 RX ORDER — LIDOCAINE HYDROCHLORIDE 10 MG/ML
20 INJECTION, SOLUTION INFILTRATION; PERINEURAL
Status: DISCONTINUED | OUTPATIENT
Start: 2024-02-16 | End: 2024-02-17 | Stop reason: HOSPADM

## 2024-02-16 RX ORDER — AZITHROMYCIN 500 MG/5ML
500 INJECTION, POWDER, LYOPHILIZED, FOR SOLUTION INTRAVENOUS ONCE
Status: COMPLETED | OUTPATIENT
Start: 2024-02-16 | End: 2024-02-16

## 2024-02-16 RX ORDER — MIDAZOLAM HYDROCHLORIDE 1 MG/ML
1 INJECTION INTRAMUSCULAR; INTRAVENOUS
Status: DISCONTINUED | OUTPATIENT
Start: 2024-02-16 | End: 2024-02-17 | Stop reason: HOSPADM

## 2024-02-16 RX ORDER — ONDANSETRON 4 MG/1
4 TABLET, ORALLY DISINTEGRATING ORAL EVERY 6 HOURS PRN
Status: DISCONTINUED | OUTPATIENT
Start: 2024-02-16 | End: 2024-02-17 | Stop reason: HOSPADM

## 2024-02-16 RX ORDER — ROPIVACAINE HYDROCHLORIDE 2 MG/ML
INJECTION, SOLUTION EPIDURAL; INFILTRATION; PERINEURAL
Status: COMPLETED
Start: 2024-02-16 | End: 2024-02-16

## 2024-02-16 RX ORDER — MEPERIDINE HYDROCHLORIDE 25 MG/ML
12.5 INJECTION INTRAMUSCULAR; INTRAVENOUS; SUBCUTANEOUS
Status: DISCONTINUED | OUTPATIENT
Start: 2024-02-16 | End: 2024-02-17 | Stop reason: HOSPADM

## 2024-02-16 RX ORDER — ACETAMINOPHEN 500 MG
1000 TABLET ORAL
Status: DISCONTINUED | OUTPATIENT
Start: 2024-02-16 | End: 2024-02-17 | Stop reason: HOSPADM

## 2024-02-16 RX ORDER — EPHEDRINE SULFATE 50 MG/ML
5 INJECTION, SOLUTION INTRAVENOUS
Status: DISCONTINUED | OUTPATIENT
Start: 2024-02-16 | End: 2024-02-17 | Stop reason: HOSPADM

## 2024-02-16 RX ORDER — TERBUTALINE SULFATE 1 MG/ML
0.25 INJECTION, SOLUTION SUBCUTANEOUS
Status: DISCONTINUED | OUTPATIENT
Start: 2024-02-16 | End: 2024-02-17 | Stop reason: HOSPADM

## 2024-02-16 RX ORDER — SODIUM CHLORIDE, SODIUM LACTATE, POTASSIUM CHLORIDE, CALCIUM CHLORIDE 600; 310; 30; 20 MG/100ML; MG/100ML; MG/100ML; MG/100ML
INJECTION, SOLUTION INTRAVENOUS CONTINUOUS
Status: DISCONTINUED | OUTPATIENT
Start: 2024-02-16 | End: 2024-02-17 | Stop reason: HOSPADM

## 2024-02-16 RX ORDER — PHENYLEPHRINE HYDROCHLORIDE 10 MG/ML
INJECTION, SOLUTION INTRAMUSCULAR; INTRAVENOUS; SUBCUTANEOUS PRN
Status: DISCONTINUED | OUTPATIENT
Start: 2024-02-16 | End: 2024-02-16 | Stop reason: SURG

## 2024-02-16 RX ORDER — LIDOCAINE HYDROCHLORIDE AND EPINEPHRINE 15; 5 MG/ML; UG/ML
INJECTION, SOLUTION EPIDURAL
Status: COMPLETED | OUTPATIENT
Start: 2024-02-16 | End: 2024-02-16

## 2024-02-16 RX ORDER — DIPHENHYDRAMINE HYDROCHLORIDE 50 MG/ML
12.5 INJECTION INTRAMUSCULAR; INTRAVENOUS
Status: DISCONTINUED | OUTPATIENT
Start: 2024-02-16 | End: 2024-02-17 | Stop reason: HOSPADM

## 2024-02-16 RX ORDER — SODIUM CHLORIDE, SODIUM GLUCONATE, SODIUM ACETATE, POTASSIUM CHLORIDE AND MAGNESIUM CHLORIDE 526; 502; 368; 37; 30 MG/100ML; MG/100ML; MG/100ML; MG/100ML; MG/100ML
1000 INJECTION, SOLUTION INTRAVENOUS ONCE
Status: COMPLETED | OUTPATIENT
Start: 2024-02-16 | End: 2024-02-16

## 2024-02-16 RX ORDER — MORPHINE SULFATE 0.5 MG/ML
INJECTION, SOLUTION EPIDURAL; INTRATHECAL; INTRAVENOUS PRN
Status: DISCONTINUED | OUTPATIENT
Start: 2024-02-16 | End: 2024-02-16 | Stop reason: SURG

## 2024-02-16 RX ORDER — SODIUM CHLORIDE, SODIUM GLUCONATE, SODIUM ACETATE, POTASSIUM CHLORIDE AND MAGNESIUM CHLORIDE 526; 502; 368; 37; 30 MG/100ML; MG/100ML; MG/100ML; MG/100ML; MG/100ML
INJECTION, SOLUTION INTRAVENOUS
Status: DISCONTINUED | OUTPATIENT
Start: 2024-02-16 | End: 2024-02-16 | Stop reason: SURG

## 2024-02-16 RX ADMIN — SODIUM BICARBONATE 1 MEQ: 84 INJECTION, SOLUTION INTRAVENOUS at 21:22

## 2024-02-16 RX ADMIN — OXYTOCIN 125 ML/HR: 10 INJECTION, SOLUTION INTRAMUSCULAR; INTRAVENOUS at 22:51

## 2024-02-16 RX ADMIN — LIDOCAINE HYDROCHLORIDE,EPINEPHRINE BITARTRATE 10 ML: 20; .005 INJECTION, SOLUTION EPIDURAL; INFILTRATION; INTRACAUDAL; PERINEURAL at 21:17

## 2024-02-16 RX ADMIN — SODIUM CHLORIDE, POTASSIUM CHLORIDE, SODIUM LACTATE AND CALCIUM CHLORIDE: 600; 310; 30; 20 INJECTION, SOLUTION INTRAVENOUS at 14:18

## 2024-02-16 RX ADMIN — SODIUM BICARBONATE 1 MEQ: 84 INJECTION, SOLUTION INTRAVENOUS at 21:17

## 2024-02-16 RX ADMIN — ROPIVACAINE HYDROCHLORIDE: 2 INJECTION, SOLUTION EPIDURAL; INFILTRATION at 14:17

## 2024-02-16 RX ADMIN — OXYTOCIN 2 MILLI-UNITS/MIN: 10 INJECTION, SOLUTION INTRAMUSCULAR; INTRAVENOUS at 10:37

## 2024-02-16 RX ADMIN — SODIUM CHLORIDE, POTASSIUM CHLORIDE, SODIUM LACTATE AND CALCIUM CHLORIDE 1000 ML: 600; 310; 30; 20 INJECTION, SOLUTION INTRAVENOUS at 05:10

## 2024-02-16 RX ADMIN — FAMOTIDINE 20 MG: 10 INJECTION, SOLUTION INTRAVENOUS at 20:46

## 2024-02-16 RX ADMIN — AZITHROMYCIN 500 MG: 500 INJECTION, POWDER, LYOPHILIZED, FOR SOLUTION INTRAVENOUS at 20:45

## 2024-02-16 RX ADMIN — SODIUM CITRATE AND CITRIC ACID MONOHYDRATE 30 ML: 334; 500 SOLUTION ORAL at 20:46

## 2024-02-16 RX ADMIN — CEFAZOLIN 2.5 G: 1 INJECTION, POWDER, FOR SOLUTION INTRAMUSCULAR; INTRAVENOUS at 21:22

## 2024-02-16 RX ADMIN — ROPIVACAINE HYDROCHLORIDE 200 MG: 2 INJECTION, SOLUTION EPIDURAL; INFILTRATION at 05:43

## 2024-02-16 RX ADMIN — SODIUM CHLORIDE, SODIUM GLUCONATE, SODIUM ACETATE, POTASSIUM CHLORIDE AND MAGNESIUM CHLORIDE: 526; 502; 368; 37; 30 INJECTION, SOLUTION INTRAVENOUS at 05:17

## 2024-02-16 RX ADMIN — MORPHINE SULFATE 1.5 MG: 0.5 INJECTION, SOLUTION EPIDURAL; INTRATHECAL; INTRAVENOUS at 21:34

## 2024-02-16 RX ADMIN — SODIUM CHLORIDE, POTASSIUM CHLORIDE, SODIUM LACTATE AND CALCIUM CHLORIDE: 600; 310; 30; 20 INJECTION, SOLUTION INTRAVENOUS at 06:09

## 2024-02-16 RX ADMIN — LIDOCAINE HYDROCHLORIDE,EPINEPHRINE BITARTRATE 3 ML: 15; .005 INJECTION, SOLUTION EPIDURAL; INFILTRATION; INTRACAUDAL; PERINEURAL at 05:18

## 2024-02-16 RX ADMIN — PHENYLEPHRINE HYDROCHLORIDE 100 MCG: 10 INJECTION INTRAVENOUS at 21:52

## 2024-02-16 RX ADMIN — METOCLOPRAMIDE 10 MG: 5 INJECTION, SOLUTION INTRAMUSCULAR; INTRAVENOUS at 20:46

## 2024-02-16 RX ADMIN — LIDOCAINE HYDROCHLORIDE,EPINEPHRINE BITARTRATE 5 ML: 20; .005 INJECTION, SOLUTION EPIDURAL; INFILTRATION; INTRACAUDAL; PERINEURAL at 21:22

## 2024-02-16 RX ADMIN — OXYTOCIN 20 UNITS: 10 INJECTION, SOLUTION INTRAMUSCULAR; INTRAVENOUS at 21:29

## 2024-02-16 RX ADMIN — SODIUM CHLORIDE, SODIUM GLUCONATE, SODIUM ACETATE, POTASSIUM CHLORIDE AND MAGNESIUM CHLORIDE 1000 ML: 526; 502; 368; 37; 30 INJECTION, SOLUTION INTRAVENOUS at 20:45

## 2024-02-16 RX ADMIN — KETOROLAC TROMETHAMINE 15 MG: 30 INJECTION, SOLUTION INTRAMUSCULAR; INTRAVENOUS at 21:57

## 2024-02-16 ASSESSMENT — LIFESTYLE VARIABLES
TOTAL SCORE: 0
CONSUMPTION TOTAL: NEGATIVE
EVER_SMOKED: NEVER
ON A TYPICAL DAY WHEN YOU DRINK ALCOHOL HOW MANY DRINKS DO YOU HAVE: 0
TOTAL SCORE: 0
DOES PATIENT WANT TO STOP DRINKING: NO
HAVE PEOPLE ANNOYED YOU BY CRITICIZING YOUR DRINKING: NO
EVER FELT BAD OR GUILTY ABOUT YOUR DRINKING: NO
AVERAGE NUMBER OF DAYS PER WEEK YOU HAVE A DRINK CONTAINING ALCOHOL: 0
HOW MANY TIMES IN THE PAST YEAR HAVE YOU HAD 5 OR MORE DRINKS IN A DAY: 0
TOTAL SCORE: 0
EVER HAD A DRINK FIRST THING IN THE MORNING TO STEADY YOUR NERVES TO GET RID OF A HANGOVER: NO
ALCOHOL_USE: NO
HAVE YOU EVER FELT YOU SHOULD CUT DOWN ON YOUR DRINKING: NO

## 2024-02-16 ASSESSMENT — PAIN SCALES - GENERAL
PAINLEVEL: 10 - WORST POSSIBLE PAIN
PAIN_LEVEL: 0

## 2024-02-16 ASSESSMENT — PAIN DESCRIPTION - PAIN TYPE
TYPE: ACUTE PAIN

## 2024-02-16 ASSESSMENT — PATIENT HEALTH QUESTIONNAIRE - PHQ9
SUM OF ALL RESPONSES TO PHQ9 QUESTIONS 1 AND 2: 0
1. LITTLE INTEREST OR PLEASURE IN DOING THINGS: NOT AT ALL
2. FEELING DOWN, DEPRESSED, IRRITABLE, OR HOPELESS: NOT AT ALL

## 2024-02-16 NOTE — PROGRESS NOTES
Cervix 9 cm / 100% effaced/+1 station    Amniotomy performed-clear fluid noted    Comfortable with epidural    Category 1 fetal heart tracing    Continue Pitocin augmentation of labor

## 2024-02-16 NOTE — CARE PLAN
The patient is Stable - Low risk of patient condition declining or worsening    Shift Goals  Clinical Goals: delivery  Patient Goals: painless healthy delivery  Family Goals: support    Progress made toward(s) clinical / shift goals:    Problem: Knowledge Deficit - L&D  Goal: Patient and family/caregivers will demonstrate understanding of plan of care, disease process/condition, diagnostic tests and medications  Outcome: Progressing  Note: POC was discussed with pt.     Problem: Pain  Goal: Patient's pain will be alleviated or reduced to the patient’s comfort goal  Flowsheets (Taken 2/16/2024 7949)  Pain Rating Scale (NPRS): 0  OB Pain Level: 0-No Pain  OB Pain Intervention:   Education   Epidural  Note: Pt is very comfortable with epidural       Patient is not progressing towards the following goals:

## 2024-02-16 NOTE — PROGRESS NOTES
1905: Report received from Rhoda NOLASCO. POC discussed. Care assumed.   1938: Report given Mathew SOUZA.   2015: Orders from Mathew SOUZA to recheck cervix at 2030.  2043: Report given to Mathew SOUZA on updated SVE.   2050: Discharge orders received from mathew SOUZA. Morphine/phenergan orders received.   2135: Discharge instructions given to pt & FOB including term labor precautions. Pt expresses understanding. Pt transferred off unit via wheelchair in stable condition.

## 2024-02-16 NOTE — PROGRESS NOTES
1637-Pt here from home with c/o spotting, contractions and decreased FM. Pt states contractions started at 1pm, spotting started around 2 and has been small amounts on and off and she has felt the baby move less all day today. Pt placed on monitors (us and toco) POC discussed with pt.  at bedside. No questions at this time.   1718-Dr Cabrera notified of pts arrival.   1700-SVE 3/80/-2 vertex. POC discussed.   1900-Report given to Rhoda NOLASCO

## 2024-02-16 NOTE — ANESTHESIA PREPROCEDURE EVALUATION
Date: 02/16/24  Procedure: Labor Epidural         Relevant Problems   No relevant active problems       Physical Exam    Airway   Mallampati: II  TM distance: >3 FB  Neck ROM: full       Cardiovascular - normal exam  Rhythm: regular  Rate: normal  (-) murmur     Dental - normal exam           Pulmonary - normal exam  Breath sounds clear to auscultation     Abdominal    Neurological - normal exam                   Anesthesia Plan    ASA 2       Plan - epidural   Neuraxial block will be labor analgesia                  Pertinent diagnostic labs and testing reviewed    Informed Consent:    Anesthetic plan and risks discussed with patient.

## 2024-02-16 NOTE — H&P
Admission History and Physical      Christine Wilks is a 34 y.o. female  at 40w3d who presents for contractions.    Subjective:   reports contractions starting yesterday morning, now lasting 50-60 seconds q2-3 minutes  reports pain  denies leaking of fluid  reports some bloody show  reports fetal movement    She was seen last night in [facility] where her cervix was noted to be unchanged after 3+ hours of monitoring. She was discharged with precautions and therapeutic rest. She states that contractions continues every 2 or so minutes while at home, and they are now much more intense.      ROS: Pertinent items are noted in HPI.    No past medical history on file.  No past surgical history on file.  OB History    Para Term  AB Living   1             SAB IAB Ectopic Molar Multiple Live Births                    # Outcome Date GA Lbr Chalo/2nd Weight Sex Delivery Anes PTL Lv   1 Current              Social History     Socioeconomic History    Marital status:      Spouse name: Not on file    Number of children: Not on file    Years of education: Not on file    Highest education level: Not on file   Occupational History    Not on file   Tobacco Use    Smoking status: Never    Smokeless tobacco: Never   Vaping Use    Vaping Use: Never used   Substance and Sexual Activity    Alcohol use: Not Currently     Alcohol/week: 0.6 oz     Types: 1 Shots of liquor per week    Drug use: Never    Sexual activity: Yes     Partners: Male   Other Topics Concern    Not on file   Social History Narrative    Not on file     Social Determinants of Health     Financial Resource Strain: Not on file   Food Insecurity: Not on file   Transportation Needs: Not on file   Physical Activity: Not on file   Stress: Not on file   Social Connections: Not on file   Intimate Partner Violence: Not on file   Housing Stability: Not on file     Allergies: Patient has no known allergies.  No current facility-administered medications for  "this encounter.    Prenatal care with [facility] starting at 9w6d with following problems:  -Primigravida in third trimester    Last US at 27 weeks, EFW 45%ile, fetal survey WN:        Objective:      /72   Pulse (!) 119   Temp 35.8 °C (96.5 °F)   Resp 20   Ht 1.575 m (5' 2\")   Wt 84.8 kg (187 lb)   SpO2 100%     General:   alert, cooperative   Skin:   normal   HEENT:  Trachea midline   Lungs:   CTA bilateral   Heart:   S1, S2 normal, no murmur, click, rub or gallop, regular rate and rhythm   Abdomen:   gravid, NT   EFW:  7#4   Pelvis:  deferred   FHT:  130 BPM   Uterine Size: S=D   Presentations: Cephalic   Cervix:     Dilation: 5    Effacement: 100%    Station:  -1    Consistency: Soft    Position: Middle     Lab Review  Lab:   Blood type: O pos    H&H: 13.1/38.9   Plt: 300   HIV: NR   Hep B: NR   Hep C: NR   RPR: NR   Rubella: immune   GC/CT: neg   GCT: WNL   GBS negative       Assessment:   Christine Wilks at 40w3d  Labor status: Early latent labor  Obstetrical history significant for: primigravida in third trimester     Plan:     1. Admit to L&D  2. GBS negative  3. Desires epidural for pain relief   4. Plan at this time is admit. Consider AROM for augmentation if appropriate. Anticipate       LIBBY Zamora MD Attending    "

## 2024-02-16 NOTE — PROGRESS NOTES
0700 Report received, assessment done. SVE done. Pt is comfortable and was turned to her left side with peanut ball.  1040 UCs are Q2-6 min spoke to Dr Chand, order received to start Pit.  Pit started at 2 milliunits  1420 Dr Chand in SVE done 9 cm +1, AROM sone clear.  1430 pt was given education re vit K and erythromycin. She still does not want baby to have them.    1730 SVE complete and pushing, pt was given instructions.  1900 report given to Denisse NOLASCO

## 2024-02-16 NOTE — ANESTHESIA PROCEDURE NOTES
Epidural Block    Date/Time: 2/16/2024 5:18 AM    Performed by: Anderson Gale M.D.  Authorized by: Anderson Gale M.D.    Patient Location:  OB  Start Time:  2/16/2024 5:18 AM  Reason for Block: labor analgesia    patient identified, IV checked, site marked, risks and benefits discussed, surgical consent, monitors and equipment checked and pre-op evaluation    Patient Position:  Sitting  Prep: ChloraPrep, patient draped and sterile technique    Monitoring:  Blood pressure, continuous pulse oximetry and heart rate  Approach:  Midline  Location:  L2-L3  Injection Technique:  DAVID air  Skin infiltration:  Lidocaine  Strength:  1%  Dose:  3ml  Needle Type:  Tuohy  Needle Gauge:  17 G  Needle Length:  3.5 in  Loss of resistance::  7  Catheter Size:  19 G  Catheter at Skin Depth:  15  Test Dose Result:  Negative

## 2024-02-16 NOTE — PROGRESS NOTES
9045- Report received from Vicky NOLASCO at , care assumed. López Gestation today at 40-3 Weeks       Patient denies ill feeling, reports +FM, -LOF, -VB. No change to vision/edema/HA; states she has been getting up to the BR herself without assist, denies dizziness or weakness.   FOB Chacho at  - patient is not expecting more visitors today.      Use of FM/[place] discussed and in place, discussed POC; ongoing as needed. Pleasant affect/mood. Review of labor process/expectations, breathing/relaxation techniques - TBD as needed. Discussed pain management options -  Pt would like an epidural at this time. Pt is reporting 10/10 contraction pain.   Patient/FOB deny questions/concerns regarding care since arrival to [facility].   RN contact information updated on the dry erase board, discussed.     0515- Baljinder RUIZ at bedside for epidural placement.     0700- Report given to Svitlana NOLASCO. Care relinquished at this time.

## 2024-02-16 NOTE — CARE PLAN
The patient is Watcher - Medium risk of patient condition declining or worsening    Shift Goals  Clinical Goals: Cervical Change and Dilation  Patient Goals: Healthy Mom; healthy baby  Family Goals: Comfort and support    Progress made toward(s) clinical / shift goals:      Problem: Psychosocial - L&D  Goal: Patient's level of anxiety will decrease  Outcome: Progressing     Problem: Risk for Venous Thromboembolism (VTE)  Goal: VTE prevention measures will be implemented and patient will remain free from VTE  Outcome: Progressing     Problem: Risk for Fluid Imbalance  Goal: Patient's fluid volume balance will be maintained or improve  Outcome: Progressing     Problem: Risk for Injury  Goal: Patient and fetus will be free of preventable injury/complications  Outcome: Progressing     Problem: Pain  Goal: Patient's pain will be alleviated or reduced to the patient’s comfort goal  Outcome: Progressing       Patient is not progressing towards the following goals:

## 2024-02-16 NOTE — PROGRESS NOTES
0421- Patient arrived to labor and delivery reporting painful contractions that are every 2-3 minutes. Patient is a  with an KASHIF of 24 making the patient 40.3 weeks pregnant. Patient denies LOF or VB and confirms good FM. External toco and FHM applied to patient. Vitals obtained. MFTI filed.     0426- Report given to LIBBY James. Received admission orders.     0445- Report given to GUARAV Salcedo.

## 2024-02-16 NOTE — ED PROVIDER NOTES
LABOR AND DELIVERY TRIAGE NOTE    PATIENT ID:  NAME:  Christine Wilks  MRN:               [id number]  YOB: 1990     34 y.o. female  at 40w2d.    Subjective: Pt presents for decreased FM and contractions starting this morning. Pt states contractions have been about 6 minutes apart lasting 1 minute. She does report that she can feel baby moving now that she is on the monitor. Pt schedule for elective IOL on the 16.     positive for contractions  positive pain   negative for LOF  positive for vaginal spotting  positive for fetal movement    PNL:  Blood Type O+, Rubella immune, HIV neg, TrepAb neg, HBsAg NR, GC/CT neg/neg  1 HR GTT: 104  GBS negative       ROS: Patient denies any fever chills, nausea, vomiting, headache, chest pain, shortness of breath, or dysuria or unusual swelling of hands or feet.     PMHx:   No past medical history on file.     OB History    Para Term  AB Living   1             SAB IAB Ectopic Molar Multiple Live Births                    # Outcome Date GA Lbr Chalo/2nd Weight Sex Delivery Anes PTL Lv   1 Current                GYN: denies STIs, no cervical procedures    PSHx:  No past surgical history on file.    Social Hx:  Social History     Tobacco Use    Smoking status: Never    Smokeless tobacco: Never   Vaping Use    Vaping Use: Never used   Substance Use Topics    Alcohol use: Not Currently     Alcohol/week: 0.6 oz     Types: 1 Shots of liquor per week    Drug use: Never         Medications:  No current facility-administered medications on file prior to encounter.     Current Outpatient Medications on File Prior to Encounter   Medication Sig Dispense Refill    Prenatal Vit-Fe Fumarate-FA (PRENATAL 1 PLUS 1 PO) Take  by mouth.         Allergies:  Patient has no known allergies.        Objective:    There were no vitals filed for this visit.  No data recorded.    General: No acute distress, resting comfortably in bed.  HEENT: normocephalic, nontraumatic,  PERRLA, EOMI  Cardiovascular: Heart RRR with no murmurs, rubs or gallops. Distal Pulses 2+  Respiratory: symmetric chest expansion, lungs CTAB, with no wheezes, rales, rhonci  Abdomen: gravid, nontender  Musculoskeletal: PADILLA spontaneously  Neuro: non focal with no numbness, tingling or changes in sensation    Cervix:  3cm/80%/-2  [place]: Uterine Contractions Q4-5 minutes.   FHRM: Baseline 140, mod variability, + accels, no decels    Labs: None    Assessment: 34 y.o. female  at 40w2d presents with contractions.     #SIUP @ 40w2d by 9w   #Early labor   - Recheck cervix in 1hr, if no change then discharge home and have patient return for elective IOL  -Cont. Prenatal care with [facility]    #Fetal status   - FHT: cat I     #GBS status  -GBS: neg     #Rubella immune, HIV neg, TrepAb neg, HBsAg NR, GC/CT neg/neg    #healthcare maintenance   -TdaP declines       Discussed case with Dr. Meza, [facility] Attending. Case was discussed and attending agreed with plan.     Nitin Cabrera MD  PGY-1, [facility]          I, Erika Combs, assumed care of this patient at 1900.     Plan:  Cervix remained unchanged. Discussed early labor with patient and discharge home. Patient opts for therapeutic rest, orders for IM morphine and PO phenergan discussed with triage Rns. Labor precautions discussed with patient, instructed to return with an increase in intensity of contractions or leaking of fluid. She has an elective IOL scheduled tomorrow, though will likely not be able to be admitted for IOL d/t staffing, will be admitted with active labor at 6cm dilation or ROM. Maternal and fetal status remained reassuring. Plan to return tomorrow with active labor.     Erika Combs CNM

## 2024-02-17 LAB
ERYTHROCYTE [DISTWIDTH] IN BLOOD BY AUTOMATED COUNT: 44 FL (ref 35.9–50)
HCT VFR BLD AUTO: 29.4 % (ref 37–47)
HGB BLD-MCNC: 10.5 G/DL (ref 12–16)
MCH RBC QN AUTO: 32.6 PG (ref 27–33)
MCHC RBC AUTO-ENTMCNC: 35.7 G/DL (ref 32.2–35.5)
MCV RBC AUTO: 91.3 FL (ref 81.4–97.8)
PLATELET # BLD AUTO: 175 K/UL (ref 164–446)
PMV BLD AUTO: 10.8 FL (ref 9–12.9)
RBC # BLD AUTO: 3.22 M/UL (ref 4.2–5.4)
WBC # BLD AUTO: 20.2 K/UL (ref 4.8–10.8)

## 2024-02-17 PROCEDURE — A9270 NON-COVERED ITEM OR SERVICE: HCPCS | Performed by: ANESTHESIOLOGY

## 2024-02-17 PROCEDURE — 85027 COMPLETE CBC AUTOMATED: CPT

## 2024-02-17 PROCEDURE — 770002 HCHG ROOM/CARE - OB PRIVATE (112)

## 2024-02-17 PROCEDURE — 36415 COLL VENOUS BLD VENIPUNCTURE: CPT

## 2024-02-17 PROCEDURE — 700105 HCHG RX REV CODE 258: Performed by: OBSTETRICS & GYNECOLOGY

## 2024-02-17 PROCEDURE — 700102 HCHG RX REV CODE 250 W/ 637 OVERRIDE(OP): Performed by: ANESTHESIOLOGY

## 2024-02-17 PROCEDURE — 700111 HCHG RX REV CODE 636 W/ 250 OVERRIDE (IP): Performed by: ANESTHESIOLOGY

## 2024-02-17 RX ORDER — KETOROLAC TROMETHAMINE 15 MG/ML
15 INJECTION, SOLUTION INTRAMUSCULAR; INTRAVENOUS EVERY 6 HOURS
Status: DISPENSED | OUTPATIENT
Start: 2024-02-17 | End: 2024-02-17

## 2024-02-17 RX ORDER — ACETAMINOPHEN 500 MG
1000 TABLET ORAL EVERY 6 HOURS
Status: COMPLETED | OUTPATIENT
Start: 2024-02-17 | End: 2024-02-17

## 2024-02-17 RX ORDER — IBUPROFEN 800 MG/1
800 TABLET ORAL EVERY 8 HOURS PRN
Status: DISCONTINUED | OUTPATIENT
Start: 2024-02-21 | End: 2024-02-18 | Stop reason: HOSPADM

## 2024-02-17 RX ORDER — EPHEDRINE SULFATE 50 MG/ML
10 INJECTION, SOLUTION INTRAVENOUS
Status: ACTIVE | OUTPATIENT
Start: 2024-02-17 | End: 2024-02-18

## 2024-02-17 RX ORDER — SIMETHICONE 125 MG
125 TABLET,CHEWABLE ORAL 4 TIMES DAILY PRN
Status: DISCONTINUED | OUTPATIENT
Start: 2024-02-17 | End: 2024-02-18 | Stop reason: HOSPADM

## 2024-02-17 RX ORDER — DIPHENHYDRAMINE HCL 25 MG
25 TABLET ORAL EVERY 6 HOURS PRN
Status: DISCONTINUED | OUTPATIENT
Start: 2024-02-18 | End: 2024-02-18 | Stop reason: HOSPADM

## 2024-02-17 RX ORDER — BISACODYL 10 MG
10 SUPPOSITORY, RECTAL RECTAL PRN
Status: DISCONTINUED | OUTPATIENT
Start: 2024-02-17 | End: 2024-02-18 | Stop reason: HOSPADM

## 2024-02-17 RX ORDER — ONDANSETRON 4 MG/1
4 TABLET, ORALLY DISINTEGRATING ORAL EVERY 6 HOURS PRN
Status: DISCONTINUED | OUTPATIENT
Start: 2024-02-18 | End: 2024-02-18 | Stop reason: HOSPADM

## 2024-02-17 RX ORDER — DIPHENHYDRAMINE HYDROCHLORIDE 50 MG/ML
25 INJECTION INTRAMUSCULAR; INTRAVENOUS EVERY 6 HOURS PRN
Status: DISCONTINUED | OUTPATIENT
Start: 2024-02-18 | End: 2024-02-18 | Stop reason: HOSPADM

## 2024-02-17 RX ORDER — VITAMIN A ACETATE, BETA CAROTENE, ASCORBIC ACID, CHOLECALCIFEROL, .ALPHA.-TOCOPHEROL ACETATE, DL-, THIAMINE MONONITRATE, RIBOFLAVIN, NIACINAMIDE, PYRIDOXINE HYDROCHLORIDE, FOLIC ACID, CYANOCOBALAMIN, CALCIUM CARBONATE, FERROUS FUMARATE, ZINC OXIDE, CUPRIC OXIDE 3080; 12; 120; 400; 1; 1.84; 3; 20; 22; 920; 25; 200; 27; 10; 2 [IU]/1; UG/1; MG/1; [IU]/1; MG/1; MG/1; MG/1; MG/1; MG/1; [IU]/1; MG/1; MG/1; MG/1; MG/1; MG/1
1 TABLET, FILM COATED ORAL
Status: DISCONTINUED | OUTPATIENT
Start: 2024-02-17 | End: 2024-02-18 | Stop reason: HOSPADM

## 2024-02-17 RX ORDER — OXYCODONE HYDROCHLORIDE 10 MG/1
10 TABLET ORAL EVERY 4 HOURS PRN
Status: ACTIVE | OUTPATIENT
Start: 2024-02-17 | End: 2024-02-18

## 2024-02-17 RX ORDER — CALCIUM CARBONATE 500 MG/1
1000 TABLET, CHEWABLE ORAL EVERY 6 HOURS PRN
Status: DISCONTINUED | OUTPATIENT
Start: 2024-02-17 | End: 2024-02-18 | Stop reason: HOSPADM

## 2024-02-17 RX ORDER — OXYCODONE HYDROCHLORIDE 10 MG/1
10 TABLET ORAL EVERY 4 HOURS PRN
Status: DISCONTINUED | OUTPATIENT
Start: 2024-02-18 | End: 2024-02-18 | Stop reason: HOSPADM

## 2024-02-17 RX ORDER — OXYCODONE HYDROCHLORIDE 5 MG/1
5 TABLET ORAL EVERY 4 HOURS PRN
Status: DISCONTINUED | OUTPATIENT
Start: 2024-02-18 | End: 2024-02-18 | Stop reason: HOSPADM

## 2024-02-17 RX ORDER — DIPHENHYDRAMINE HYDROCHLORIDE 50 MG/ML
25 INJECTION INTRAMUSCULAR; INTRAVENOUS EVERY 6 HOURS PRN
Status: ACTIVE | OUTPATIENT
Start: 2024-02-17 | End: 2024-02-18

## 2024-02-17 RX ORDER — SODIUM CHLORIDE, SODIUM LACTATE, POTASSIUM CHLORIDE, CALCIUM CHLORIDE 600; 310; 30; 20 MG/100ML; MG/100ML; MG/100ML; MG/100ML
INJECTION, SOLUTION INTRAVENOUS PRN
Status: DISCONTINUED | OUTPATIENT
Start: 2024-02-17 | End: 2024-02-18 | Stop reason: HOSPADM

## 2024-02-17 RX ORDER — ACETAMINOPHEN 500 MG
1000 TABLET ORAL EVERY 6 HOURS
Qty: 2 TABLET | Refills: 0 | Status: DISPENSED | OUTPATIENT
Start: 2024-02-18 | End: 2024-02-18

## 2024-02-17 RX ORDER — ONDANSETRON 2 MG/ML
4 INJECTION INTRAMUSCULAR; INTRAVENOUS EVERY 6 HOURS PRN
Status: DISCONTINUED | OUTPATIENT
Start: 2024-02-18 | End: 2024-02-18 | Stop reason: HOSPADM

## 2024-02-17 RX ORDER — OXYCODONE HYDROCHLORIDE 5 MG/1
5 TABLET ORAL EVERY 4 HOURS PRN
Status: ACTIVE | OUTPATIENT
Start: 2024-02-17 | End: 2024-02-18

## 2024-02-17 RX ORDER — IBUPROFEN 800 MG/1
800 TABLET ORAL EVERY 8 HOURS
Qty: 9 TABLET | Refills: 0 | Status: DISCONTINUED | OUTPATIENT
Start: 2024-02-18 | End: 2024-02-18 | Stop reason: HOSPADM

## 2024-02-17 RX ORDER — HYDROMORPHONE HYDROCHLORIDE 1 MG/ML
0.4 INJECTION, SOLUTION INTRAMUSCULAR; INTRAVENOUS; SUBCUTANEOUS
Status: ACTIVE | OUTPATIENT
Start: 2024-02-17 | End: 2024-02-18

## 2024-02-17 RX ORDER — HYDROMORPHONE HYDROCHLORIDE 1 MG/ML
0.2 INJECTION, SOLUTION INTRAMUSCULAR; INTRAVENOUS; SUBCUTANEOUS
Status: ACTIVE | OUTPATIENT
Start: 2024-02-17 | End: 2024-02-18

## 2024-02-17 RX ORDER — DIPHENHYDRAMINE HYDROCHLORIDE 50 MG/ML
12.5 INJECTION INTRAMUSCULAR; INTRAVENOUS EVERY 6 HOURS PRN
Status: ACTIVE | OUTPATIENT
Start: 2024-02-17 | End: 2024-02-18

## 2024-02-17 RX ORDER — IBUPROFEN 800 MG/1
800 TABLET ORAL EVERY 8 HOURS PRN
Status: DISCONTINUED | OUTPATIENT
Start: 2024-02-21 | End: 2024-02-17

## 2024-02-17 RX ORDER — ACETAMINOPHEN 500 MG
1000 TABLET ORAL EVERY 6 HOURS PRN
Status: DISCONTINUED | OUTPATIENT
Start: 2024-02-21 | End: 2024-02-18 | Stop reason: HOSPADM

## 2024-02-17 RX ORDER — ONDANSETRON 2 MG/ML
4 INJECTION INTRAMUSCULAR; INTRAVENOUS EVERY 6 HOURS PRN
Status: ACTIVE | OUTPATIENT
Start: 2024-02-17 | End: 2024-02-18

## 2024-02-17 RX ORDER — ACETAMINOPHEN 500 MG
1000 TABLET ORAL EVERY 6 HOURS
Status: DISCONTINUED | OUTPATIENT
Start: 2024-02-18 | End: 2024-02-18 | Stop reason: HOSPADM

## 2024-02-17 RX ORDER — CLINDAMYCIN PHOSPHATE 900 MG/50ML
900 INJECTION, SOLUTION INTRAVENOUS EVERY 8 HOURS
Status: DISCONTINUED | OUTPATIENT
Start: 2024-02-17 | End: 2024-02-17

## 2024-02-17 RX ORDER — OXYTOCIN 10 [USP'U]/ML
10 INJECTION, SOLUTION INTRAMUSCULAR; INTRAVENOUS
Status: DISCONTINUED | OUTPATIENT
Start: 2024-02-17 | End: 2024-02-18 | Stop reason: HOSPADM

## 2024-02-17 RX ORDER — SODIUM CHLORIDE, SODIUM LACTATE, POTASSIUM CHLORIDE, CALCIUM CHLORIDE 600; 310; 30; 20 MG/100ML; MG/100ML; MG/100ML; MG/100ML
INJECTION, SOLUTION INTRAVENOUS ONCE
Status: COMPLETED | OUTPATIENT
Start: 2024-02-17 | End: 2024-02-17

## 2024-02-17 RX ORDER — DOCUSATE SODIUM 100 MG/1
100 CAPSULE, LIQUID FILLED ORAL 2 TIMES DAILY PRN
Status: DISCONTINUED | OUTPATIENT
Start: 2024-02-17 | End: 2024-02-18 | Stop reason: HOSPADM

## 2024-02-17 RX ORDER — IBUPROFEN 800 MG/1
800 TABLET ORAL EVERY 8 HOURS
Status: DISCONTINUED | OUTPATIENT
Start: 2024-02-18 | End: 2024-02-17

## 2024-02-17 RX ADMIN — SODIUM CHLORIDE, POTASSIUM CHLORIDE, SODIUM LACTATE AND CALCIUM CHLORIDE: 600; 310; 30; 20 INJECTION, SOLUTION INTRAVENOUS at 03:29

## 2024-02-17 RX ADMIN — ACETAMINOPHEN 1000 MG: 500 TABLET ORAL at 21:07

## 2024-02-17 RX ADMIN — KETOROLAC TROMETHAMINE 15 MG: 15 INJECTION, SOLUTION INTRAMUSCULAR; INTRAVENOUS at 21:08

## 2024-02-17 RX ADMIN — ACETAMINOPHEN 1000 MG: 500 TABLET ORAL at 10:47

## 2024-02-17 RX ADMIN — KETOROLAC TROMETHAMINE 15 MG: 15 INJECTION, SOLUTION INTRAMUSCULAR; INTRAVENOUS at 13:32

## 2024-02-17 RX ADMIN — KETOROLAC TROMETHAMINE 15 MG: 15 INJECTION, SOLUTION INTRAMUSCULAR; INTRAVENOUS at 06:35

## 2024-02-17 RX ADMIN — ACETAMINOPHEN 1000 MG: 500 TABLET ORAL at 03:25

## 2024-02-17 RX ADMIN — ACETAMINOPHEN 1000 MG: 500 TABLET ORAL at 17:56

## 2024-02-17 ASSESSMENT — PAIN DESCRIPTION - PAIN TYPE
TYPE: SURGICAL PAIN
TYPE: ACUTE PAIN
TYPE: ACUTE PAIN
TYPE: SURGICAL PAIN
TYPE: SURGICAL PAIN

## 2024-02-17 NOTE — ANESTHESIA POSTPROCEDURE EVALUATION
Patient: Christine Wilks    Procedure Summary       Date: 24 Room / Location: LND OR 02 / SURGERY LABOR AND DELIVERY    Anesthesia Start: 517 Anesthesia Stop:     Procedure:  SECTION, PRIMARY (Abdomen) Diagnosis:       Status post primary low transverse  section      (Primary  due to Arrest of Descent)    Surgeons: Amando Chand M.D. Responsible Provider: Terrance Han M.D.    Anesthesia Type: epidural ASA Status: 2            Final Anesthesia Type: epidural  Last vitals  BP   Blood Pressure: 139/86    Temp   37 °C (98.6 °F)    Pulse   (!) 117   Resp   18    SpO2   98 %      Anesthesia Post Evaluation    Patient location during evaluation: floor  Patient participation: complete - patient participated  Level of consciousness: awake and alert  Pain score: 0    Airway patency: patent  Anesthetic complications: no  Cardiovascular status: hemodynamically stable  Respiratory status: acceptable  Hydration status: euvolemic    PONV: none        No notable events documented.     Nurse Pain Score: 0 (NPRS)

## 2024-02-17 NOTE — PROGRESS NOTES
Cervix-complete complete +2 station    Pushed adequately x 3 hours    Adequate labor pattern    Recommend immediate  section-charge nurse and RT notified    Preoperative counseling performed;  Risks benefits alternatives discussed; risk of pain bleeding infection damage to any or all internal organs including not limited to bowel intestines bladder uterus tubes nerves blood vessel skin and baby possible blood transfusion with inherent risk of AIDS and hepatitis patient has consented blood transfusion as needed.  Possible uterine or incisional infection.  Possible wound hematoma or seroma with the need to open incision to drain all discussed  All questions answered detail  Consent signed

## 2024-02-17 NOTE — PROGRESS NOTES
**Late Entry due to Patient Care**     - Report received from Svitlana NOLASCO at , care assumed. López Gestation today at 40-3 Weeks       Pt complete and pushing with dayshift RN.    - Mannie RUIZ at bedside for status update.     - Joey RUIZ and SAIDA Coates CNM at bedside for status update. SAIDA Coates staying at bedside to push with patient.     - Mannie RUIZ called to bedside to assess pushing.     - Decision for     - Delivery of viable male infant via  with Mannie RUIZ; see delivery summary for details.     - Pt transported to  via gurney with infant swaddled in arms. FOB following with belongings. Report given to Maryjane NOLASCO at the bedside. Fundal rub complete with PP RN. Infant's bands checked against the bands of the POB; all correct. Care relinquished at this time.

## 2024-02-17 NOTE — ANESTHESIA TIME REPORT
Anesthesia Start and Stop Event Times       Date Time Event    2/16/2024 0517 Ready for Procedure     0517 Anesthesia Start     2215 Anesthesia Stop          Responsible Staff  02/16/24      Name Role Begin End    Anderson Gale M.D. Anesth 0517 0701    Terrance Han M.D. Anesth 0701 2218          Overtime Reason:  no overtime (within assigned shift)    Comments:

## 2024-02-17 NOTE — PROGRESS NOTES
Pharmacy Gentamicin Kinetics Note for 2024     34 y.o. female on Gentamicin day # 1    Gentamicin Indication: Intra-abdominal infection     Provider specified end date: 24    Active Antibiotics (From admission, onward)      Ordered     Ordering Provider       Sat 2024  6:24 AM    24 0624  gentamicin (Garamycin) 340 mg in  mL IVPB  EVERY 24 HOURS         Amando Chand M.D.       Sat 2024  6:02 AM    24 0602  ampicillin (Omnipen) 2,000 mg in  mL IVPB  EVERY 6 HOURS         Amando Chand M.D.    24 0602  clindamycin (Cleocin) IVPB premix 900 mg  EVERY 8 HOURS         Amando Chand M.D.    24 0602  MD Alert...Gentamicin per Pharmacy  PHARMACY TO DOSE        Question:  Indication(s) for aminoglycoside?  Answer:  Intra-abdominal infection    Amando Chand M.D.            Dosing Weight: 67.5 kg (148 lb 13 oz)    Admission History: Admitted on 2024 for Labor and delivery indication for care or intervention [O75.9]   Pertinent history: Pt had primary low transverse uterine  section.Pt  currently tachycardiac, WBC elevated and trending upwards. MD starting on abx for intra-abdominal infection. CTM patient status.    Allergies:     Patient has no known allergies.     Pertinent cultures to date:      Results       Procedure Component Value Units Date/Time    BLOOD CULTURE [[id number]]     Order Status: Sent Specimen: Blood from Peripheral     BLOOD CULTURE [[id number]]     Order Status: Sent Specimen: Blood from Peripheral             Labs:    CrCl cannot be calculated (No successful lab value found.).  Recent Labs     24  0508 24  0448   WBC 22.9* 20.2*   NEUTSPOLYS 91.20*  --          Intake/Output Summary (Last 24 hours) at 2024 0642  Last data filed at 2024 2330  Gross per 24 hour   Intake 2751.94 ml   Output 1150 ml   Net 1601.94 ml     BP 97/61   Pulse (!) 102   Temp 36.3 °C (97.4 °F) (Temporal)   Resp 18   Ht 1.575  "m (5' 2\")   Wt 84.8 kg (187 lb)   SpO2 96%  Temp (24hrs), Av.8 °C (98.3 °F), Min:36.1 °C (97 °F), Max:37.4 °C (99.3 °F)      List concerns for Gentamicin clearance:     None    A/P:     - Gentamicin dose: 340 mg every 24 hours     - Next gentamicin level: None ordered at this time. Likely obtain levels after the 4th maintenance dose unless clinical status or renal function changes.      - Goal trough: Less than 2 mcg/mL    - Comments: Pharmacy will continue to monitor patient clinical status and labs, adjust therapy as clinically indicated.     Maria Teresa Parra, PharmD      "

## 2024-02-17 NOTE — OR SURGEON
Immediate Post OP Note    PreOp Diagnosis: Arrest of descent term pregnancy      PostOp Diagnosis: Same      Procedure(s):   SECTION, PRIMARY    Surgeon(s):  Amando Chand M.D.    Anesthesiologist/Type of Anesthesia:  Anesthesiologist: Terrance Han M.D.; Anderson Gale M.D./Spinal    Surgical Staff:  Circulator: Denisse Mckeon R.N.  Scrub Person: Junie Clarke  L&XANDER Circulator Assistant: Madisyn Lugo R.N.  L&XANDER Baby  Nurse: Cristal Montague R.N.    Specimens removed if any:  * No specimens in log *    Estimated Blood Loss: 500 cc    Findings: Apgar scores 8 and 9, cephalic presentation occiput transverse, normal pelvis Cord gas results pending    Complications: None        2024 10:11 PM Amando Chand M.D.

## 2024-02-17 NOTE — PROGRESS NOTES
"S: Patient is pushing well with contractions. VSS, afebrile at this time. Admitted early this morning in active labor. Pit running at 6 mu/min. Reviewed labor and pushing process and normal timeline. She desires , so we will work hard for this. POC discussed, all questions answered.     O: /86   Pulse (!) 117   Temp 37 °C (98.6 °F)   Resp 18   Ht 1.575 m (5' 2\")   Wt 84.8 kg (187 lb)   SpO2 98%   BMI 34.20 kg/m²            FHTs:  Baseline 155, variability: moderate, accels: present, decels: some early/late decels        [place]: Contractions q 2-6 minutes        SVE: C/C/+2, pushing x 2 hours on my arrival    Has now pushed for another hour with me with no further decent. Dr Chand called to bedside to assess- not appropriate for assisted delivery. Recommend P CS now, she is agreeable. Prep begun     A/P:    1.  IUP @ 40w3d  2.  Cat 2 FHTs    3.  Prolonged 2nd stage  4.  Anticipate  ASAP    Mily Coates CNM, APRN   "

## 2024-02-17 NOTE — OP REPORT
DATE OF SERVICE:  2024     PREOPERATIVE DIAGNOSIS:  Intrauterine pregnancy at 40 and 3/7 weeks with   arrest of descent secondary to cephalopelvic disproportion.     POSTOPERATIVE DIAGNOSIS:  Intrauterine pregnancy at 40 and 3/7 weeks with   arrest of descent secondary to cephalopelvic disproportion.     SURGEON:  Amando Chand MD     ASSISTANT:  Mily Coates CNM     PROCEDURE:  Primary low transverse uterine  section.     COMPLICATIONS:  None.     ESTIMATED BLOOD LOSS:  500 mL.     ANESTHESIA:  Epidural.     ANESTHESIOLOGIST:  Terrance Han MD     DRAINS:  Nguyen catheter.     SPECIMENS:  Cord gases sent to pathology department.     COMPLICATIONS:  None.     INDICATIONS:  This patient is a 34-year-old female  1, para 0 at 40 and   3/7 weeks, admitted in active labor, dilated to complete and complete and   pushes for approximately 3 hours with no further descent of the fetal head   despite adequate labor and adequate maternal pushing efforts.     FINDINGS:  Apgar scores 8 and 9, cephalic presentation, occiput transverse   position.  Cord gas results are pending.  Normal pelvis.     DESCRIPTION OF PROCEDURE:  After adequately being counseled, the patient was   taken to the operating room and placed in supine position.  Epidural   anesthesia was dosed.  She was prepped and draped in the usual sterile   fashion.  Fetal heart tones normal before and after dosing of the epidural.    She was prepped and draped as noted.  A Pfannenstiel skin incision made with a   scalpel, taken down to the fascia.  The fascia was incised with a scalpel and   the fascial incision taken laterally on both sides with Dye scissors.    Rectus fascia dissected off the underlying rectus muscles both superiorly and   inferiorly and rectus muscles were split in the midline.  The peritoneal   cavity was entered by elevating the peritoneum using hemostats and incising   with Metzenbaum scissors.  Peritoneal incision taken  superiorly and inferiorly   and bladder blade placed over the bladder.  Next, bladder flap was developed   sharply and the bladder blade replaced in the bladder.  Next, a low transverse   uterine incision was made with a scalpel.  The infant was delivered, bulb   suctioned.  Umbilical cord was clamped and cut after delayed cord clamping and   the infant was handed off to pediatric nurses.  Placenta was removed from the   uterus.  Uterine cavity was cleansed with a moist laparotomy sponge.  Uterus   was exteriorized and the uterine incision was closed in 2 layers using 0   Vicryl in a running locked fashion.  Second layer was an imbricating layer.    Next, the uterus was returned to abdominopelvic cavity.  All surgical sites   were checked for hemostasis, which was confirmed.  Next, the peritoneal lining   was closed using running nonlocked stitch of 0 Vicryl.  Rectus muscles were   reapproximated in the midline using interrupted stitch of 0 chromic.  Rectus   fascia closed using running nonlocked stitch of 0 Vicryl.  Subcutaneous   tissues were irrigated and suctioned.  Electrocautery used for hemostasis.    Several subcuticular stitches of 0 Vicryl were used to reapproximate   subcutaneous tissues.  Skin was closed using surgical staples and a pressure   dressing was applied.  The patient was taken to recovery room in good   condition.  No complications noted.  Surgical timeout was performed prior to   beginning the surgery and surgical and instrument counts were all correct   throughout the procedure.        ______________________________  MD DEANDRE RING/YING    DD:  02/16/2024 22:17  DT:  02/16/2024 22:33    Job#:  [id number]

## 2024-02-17 NOTE — CARE PLAN
The patient is Stable - Low risk of patient condition declining or worsening    Shift Goals  Clinical Goals: VSS, pain control  Patient Goals: rest  Family Goals: support    Progress made toward(s) clinical / shift goals:  VSS, Fundus remains firm, lochia light. Pt's pain is controlled by PRN pain medications    Patient is not progressing towards the following goals:

## 2024-02-17 NOTE — PROGRESS NOTES
"POSTOPERATIVE  SECTION PROGRESS NOTE;        PATIENT ID:  NAME:  Christine Wilks  MRN:               [id number]  YOB: 1990       34 y.o. female  at 40w3d POD#1 s/p primary LTCS for arrest of descent      Subjective: Doing well no complaints    Objective:    Vitals:    24 0156 24 0359 24 0400 24 0500   BP: 98/62      Pulse: (!) 107 (!) 120 (!) 111 (!) 111   Resp:    Temp: 37.1 °C (98.7 °F) 36.7 °C (98 °F)     TempSrc: Temporal Temporal     SpO2: 95% 95% 95% 96%   Weight:       Height:         General: No acute distress, resting comfortably in bed.  HEENT: normocephalic, nontraumatic, PERRLA, EOMI  Cardiovascular: Heart RRR with no murmurs, rubs or gallops. Distal Pulses 2+  Respiratory: symmetric chest expansion, lungs CTA bilaterally with no wheezes rales or rhonci  Abdomen: soft, mildly tender around incision which is clean, dry and intact, fundus firm, +BS  Genitourinary: lochia light, denies excessive vaginal bleeding  Musculoskeletal: strength 5/5 in four extremities  Neuro: non focal with no numbness, tingling or changes in sensation      Recent Labs     24  0508   WBC 22.9*   RBC 4.67   HEMOGLOBIN 14.9   HEMATOCRIT 41.6   MCV 89.1   MCH 31.9   RDW 42.7   PLATELETCT 248   MPV 10.9   NEUTSPOLYS 91.20*   LYMPHOCYTES 3.70*   MONOCYTES 3.80   EOSINOPHILS 0.00   BASOPHILS 0.30     No results for input(s): \"SODIUM\", \"POTASSIUM\", \"CHLORIDE\", \"CO2\", \"GLUCOSE\", \"BUN\", \"CPKTOTAL\" in the last 72 hours.    Current Meds:   Current Facility-Administered Medications   Medication Dose Frequency Provider Last Rate Last Admin    acetaminophen (Tylenol) tablet 1,000 mg  1,000 mg Q6HR Terrance Han M.D.   1,000 mg at 24 0325    ketorolac (Toradol) 15 MG/ML injection 15 mg  15 mg Q6HRS Terrance Han M.D.        oxyCODONE immediate-release (Roxicodone) tablet 5 mg  5 mg Q4HRS PRN Terrance Han M.D.        oxyCODONE immediate release (Roxicodone) tablet 10 " mg  10 mg Q4HRS PRN Terrance Han M.D.        HYDROmorphone (Dilaudid) injection 0.2 mg  0.2 mg Q2HRS PRANA Han M.D.        HYDROmorphone (Dilaudid) injection 0.4 mg  0.4 mg Q2HRS PRN Terrance Han M.D.        ePHEDrine injection 10 mg  10 mg Q5 MIN PRN Terrance Han M.D.        diphenhydrAMINE (Benadryl) injection 12.5 mg  12.5 mg Q6HRS PRN Terrance Han M.D.        Or    diphenhydrAMINE (Benadryl) injection 25 mg  25 mg Q6HRS PRANA Han M.D.        Or    naloxone HCl (Narcan) 20 mg in  mL infusion  0.4 mg/hr Q6HRS PRANA Han M.D.        ondansetron (Zofran) syringe/vial injection 4 mg  4 mg Q6HRS PRANA Han M.D.        diphenhydrAMINE (Benadryl) injection 12.5 mg  12.5 mg Q6HRS PRANA Han M.D.        lactated ringers infusion   PRN Amando Chand M.D.        [START ON 2/18/2024] ibuprofen (Motrin) tablet 800 mg  800 mg Q8HRS Amando Chand M.D.        Followed by    [START ON 2/21/2024] ibuprofen (Motrin) tablet 800 mg  800 mg Q8HRS PRN Amando Chand M.D.        [START ON 2/18/2024] acetaminophen (Tylenol) tablet 1,000 mg  1,000 mg Q6HRS Amando Chand M.D.        Followed by    [START ON 2/21/2024] acetaminophen (Tylenol) tablet 1,000 mg  1,000 mg Q6HRS PRANA Chand M.D.        [START ON 2/18/2024] oxyCODONE immediate-release (Roxicodone) tablet 5 mg  5 mg Q4HRS PRN Amando Chand M.D.        [START ON 2/18/2024] oxyCODONE immediate release (Roxicodone) tablet 10 mg  10 mg Q4HRS PRN Amando Chand M.D.        [START ON 2/18/2024] ondansetron (Zofran) syringe/vial injection 4 mg  4 mg Q6HRS PRN Amando Chand M.D.        Or    [START ON 2/18/2024] ondansetron (Zofran ODT) dispertab 4 mg  4 mg Q6HRS PRN Amando Chand M.D.        [START ON 2/18/2024] diphenhydrAMINE (Benadryl) tablet/capsule 25 mg  25 mg Q6HRS PRN Amando Chand M.D.        Or    [START ON 2/18/2024] diphenhydrAMINE (Benadryl) injection 25 mg  25 mg Q6HRS PRN  Amando Chand M.D.        docusate sodium (Colace) capsule 100 mg  100 mg BID PRN Amando Chand M.D.        tetanus-dipth-acell pertussis (Tdap) inj 0.5 mL  0.5 mL Once PRN Amando Chand M.D.        measles, mumps and rubella vaccine (Mmr) injection 0.5 mL  0.5 mL Once PRN Amando Chand M.D.        oxytocin (Pitocin) infusion bolus (for post delivery)  20 Units Once Amando Chand M.D.        Followed by    oxytocin (Pitocin) infusion (for post delivery)  125 mL/hr Continuous Amando Chand M.D.        oxytocin (Pitocin) injection 10 Units  10 Units Once PRN Amando Chnad M.D.        bisacodyl (Dulcolax) suppository 10 mg  10 mg PRN Amando Chand M.D.        magnesium hydroxide (Milk Of Magnesia) suspension 30 mL  30 mL Q6HRS PRN Amando Chand M.D.        prenatal plus vitamin (Stuartnatal 1+1) 27-1 MG tablet 1 Tablet  1 Tablet Daily-0800 Amando Chand M.D.        simethicone (Mylicon) chewable tablet 125 mg  125 mg 4X/DAY PRN Amando Chand M.D.        calcium carbonate (Tums) chewable tab 1,000 mg  1,000 mg Q6HRS PRN Amando Chand M.D.       Last reviewed on 2/16/2024  9:50 PM by Denisse Mckeon R.N.         Assessment:  34 y.o. female  at 40w3d POD# 1 s/p primary LTCS for arrest of descent-patient has tachycardia postoperatively blood pressure urine outputs look great we will check on CBC today results not yet available for this morning    Plan:   Check CBC, DC Nguyen, ambulate, regular diet  Discharge to home POD 3 if stable    Amando Chand MD

## 2024-02-17 NOTE — LACTATION NOTE
This note was copied from a baby's chart.  Initial consultation:    40w3d infant delivered via c section for arrest of descent to a  mother 24 at 2129.     Breast feeding risk factors: none noted    Mom reports baby has been able to latch on the right side more easily than the left. She would like assistance on the left side.     Bilateral breast assessment WNL, nipples everted and intact, no damage noted.    Baby placed skin to skin in football position on the left side. LC assisted with proper positioning, breast wedging and asymmetrical latch technique. Baby noted to be bitey at the breast and despite several attempts and position changes, a latch was not achieved on the left. LC provided extensive education and demonstration regarding how to achieve a deep latch. Mom not able to comfortably support baby on the left side at this time. Baby moved into cross cradle position on the right side, mom unable to provide the support needed for this position. He was moved into football position on the right side and a deep latch was achieved.     Baby noted to be mostly sucking with a disorganized and non nutritive suck, eventually a nutritive suck noted on the right breast.     LC encouraged mom to keep baby skin to skin as much as possible, and to continue to offer both sides when breast feeding. She was encouraged to reach out to RN/LC for latch assistance as needed.     Breast feeding education provided: Education provided regarding the milk making process and supply in demand. Frequent skin to skin encouraged. Encouraged MOB to offer the breast to baby any time he is showing hunger cues (cues reviewed). Encouraged MOB not to limit baby's time at the breast. Anticipatory guidance provided regarding typical  feeding behaviors in the first 24-48hrs, including cluster feeding. Proper positioning and latch technique verbalized. Education provided regarding the importance of achieving a deep latch with each  feeding to ensure proper stimulation, milk transfer, and reduce the chance for nipple damage/pain. She was encouraged to download [employer] [facility] [employer] argenis and watch breast feeding videos.     Plan: Continue offering the breast to baby on cue, a minimum of 8 times every 24hrs. Skin to skin for each feeding. Hand expression and feed back colostrum (with RN assistance) if baby due to feed, but too sleepy or unable to latch. Do not limit baby's time at the breast. Reach out to RN/LC if experiencing pain with latch or if unable to latch baby independently. If optimal latch (latch score greater than or equal to 7) is not achieved at 24hrs of age, a three step feeding plan should be initiated.

## 2024-02-18 ENCOUNTER — PHARMACY VISIT (OUTPATIENT)
Dept: PHARMACY | Facility: MEDICAL CENTER | Age: 34
End: 2024-02-18
Payer: COMMERCIAL

## 2024-02-18 VITALS
TEMPERATURE: 97 F | BODY MASS INDEX: 34.41 KG/M2 | RESPIRATION RATE: 16 BRPM | WEIGHT: 187 LBS | SYSTOLIC BLOOD PRESSURE: 114 MMHG | OXYGEN SATURATION: 96 % | HEART RATE: 89 BPM | DIASTOLIC BLOOD PRESSURE: 70 MMHG | HEIGHT: 62 IN

## 2024-02-18 PROCEDURE — 700102 HCHG RX REV CODE 250 W/ 637 OVERRIDE(OP): Performed by: OBSTETRICS & GYNECOLOGY

## 2024-02-18 PROCEDURE — A9270 NON-COVERED ITEM OR SERVICE: HCPCS | Performed by: OBSTETRICS & GYNECOLOGY

## 2024-02-18 PROCEDURE — RXMED WILLOW AMBULATORY MEDICATION CHARGE

## 2024-02-18 RX ORDER — OXYCODONE HYDROCHLORIDE 5 MG/1
5 TABLET ORAL EVERY 4 HOURS PRN
Qty: 10 TABLET | Refills: 0 | Status: SHIPPED | OUTPATIENT
Start: 2024-02-18 | End: 2024-02-23

## 2024-02-18 RX ORDER — PSEUDOEPHEDRINE HCL 30 MG
100 TABLET ORAL 2 TIMES DAILY PRN
Qty: 60 CAPSULE | Refills: 0 | Status: SHIPPED | OUTPATIENT
Start: 2024-02-18 | End: 2024-03-20 | Stop reason: CLARIF

## 2024-02-18 RX ORDER — IBUPROFEN 800 MG/1
800 TABLET ORAL EVERY 8 HOURS PRN
Qty: 30 TABLET | Refills: 0 | Status: SHIPPED | OUTPATIENT
Start: 2024-02-18

## 2024-02-18 RX ORDER — ACETAMINOPHEN 500 MG
1000 TABLET ORAL EVERY 6 HOURS PRN
Qty: 30 TABLET | Refills: 0 | Status: SHIPPED | OUTPATIENT
Start: 2024-02-18 | End: 2024-03-20 | Stop reason: CLARIF

## 2024-02-18 RX ADMIN — ACETAMINOPHEN 1000 MG: 500 TABLET ORAL at 04:15

## 2024-02-18 RX ADMIN — ACETAMINOPHEN 1000 MG: 500 TABLET ORAL at 12:48

## 2024-02-18 RX ADMIN — IBUPROFEN 800 MG: 800 TABLET, FILM COATED ORAL at 12:47

## 2024-02-18 RX ADMIN — VITAMIN A, VITAMIN C, VITAMIN D, VITAMIN E, THIAMINE, RIBOFLAVIN, NIACIN, VITAMIN B6, FOLIC ACID, VITAMIN B12, CALCIUM, IRON, ZINC, COPPER 1 TABLET: 4000; 120; 400; 22; 1.84; 3; 20; 10; 1; 12; 200; 27; 25; 2 TABLET ORAL at 08:12

## 2024-02-18 RX ADMIN — IBUPROFEN 800 MG: 800 TABLET, FILM COATED ORAL at 05:56

## 2024-02-18 ASSESSMENT — EDINBURGH POSTNATAL DEPRESSION SCALE (EPDS)
I HAVE BLAMED MYSELF UNNECESSARILY WHEN THINGS WENT WRONG: YES, SOME OF THE TIME
THE THOUGHT OF HARMING MYSELF HAS OCCURRED TO ME: NEVER
I HAVE BEEN ANXIOUS OR WORRIED FOR NO GOOD REASON: YES, SOMETIMES
I HAVE LOOKED FORWARD WITH ENJOYMENT TO THINGS: AS MUCH AS I EVER DID
I HAVE BEEN SO UNHAPPY THAT I HAVE HAD DIFFICULTY SLEEPING: NOT AT ALL
I HAVE FELT SCARED OR PANICKY FOR NO GOOD REASON: YES, SOMETIMES
I HAVE BEEN SO UNHAPPY THAT I HAVE BEEN CRYING: ONLY OCCASIONALLY
I HAVE BEEN ABLE TO LAUGH AND SEE THE FUNNY SIDE OF THINGS: AS MUCH AS I ALWAYS COULD
I HAVE FELT SAD OR MISERABLE: NO, NOT AT ALL
THINGS HAVE BEEN GETTING ON TOP OF ME: NO, MOST OF THE TIME I HAVE COPED QUITE WELL

## 2024-02-18 ASSESSMENT — PAIN DESCRIPTION - PAIN TYPE: TYPE: ACUTE PAIN;SURGICAL PAIN

## 2024-02-18 NOTE — PROGRESS NOTES
"45-62\" abdominal binder sent to tube station 31     Contact traction for any questions or concerns.   "

## 2024-02-18 NOTE — DISCHARGE INSTRUCTIONS

## 2024-02-18 NOTE — PROGRESS NOTES
Obstetrics & Gynecology Post-Delivery Progress Note    Date of Service      34 y.o.  at 40w3d POD#2 s/p primary LTCS for arrest of descent   Delivery date: 24      Subjective  Pt reports she is doing well, no complaints     Pain: Well controlled  Bleeding: lochia minimal  Tolerating PO: yes  Voiding: without difficulty  Ambulating: yes  Passing flatus: Yes  Feeding: breastfeeding well      Objective  24hr VS:  Temp:  [36.1 °C (97 °F)-37.1 °C (98.8 °F)] 36.1 °C (97 °F)  Pulse:  [] 89  Resp:  [16-18] 16  BP: ()/(53-76) 114/70  SpO2:  [94 %-97 %] 96 %    Physical Exam  Gen: well appearing, no apparent distress, resting comfortably in bed  CV: rrr, no m/r/g  Resp: CTAB, symmetric breath sounds  Abd: soft, nontender, nondistended  Fundus: firm and below umbilicus  Incision: dressing clean, dry, intact and no significant drainage  Ext: symmetric, no peripheral edema, calves nontender    Labs:  Recent Labs     24  0508 24  0448   WBC 22.9* 20.2*   RBC 4.67 3.22*   HEMOGLOBIN 14.9 10.5*   HEMATOCRIT 41.6 29.4*   MCV 89.1 91.3   MCH 31.9 32.6   RDW 42.7 44.0   PLATELETCT 248 175   MPV 10.9 10.8   NEUTSPOLYS 91.20*  --    LYMPHOCYTES 3.70*  --    MONOCYTES 3.80  --    EOSINOPHILS 0.00  --    BASOPHILS 0.30  --          Medications  acetaminophen, 1,000 mg, Oral, Q6HRS  prenatal plus vitamin, 1 Tablet, Oral, Daily-0800  acetaminophen, 1,000 mg, Oral, Q6HR  ibuprofen, 800 mg, Oral, Q8HR      PRN medications: LR, acetaminophen **FOLLOWED BY** [START ON 2024] acetaminophen, oxyCODONE immediate-release, oxyCODONE immediate release, ondansetron **OR** ondansetron, diphenhydrAMINE **OR** diphenhydrAMINE, docusate sodium, tetanus-dipth-acell pertussis, measles, mumps and rubella vaccine, oxytocin, bisacodyl, magnesium hydroxide, simethicone, calcium carbonate, ibuprofen **FOLLOWED BY** [START ON 2024] ibuprofen      Assessment/Plan  Christine Wilks is a 34 y.o.yo  postpartum  day #2  s/p primary LTCS for arrest of descent     - Post care: meeting all goals  - Pain: controlled  - Rh+  - Rubella Immune  - Method of Feeding: plans to breastfeed  - Method of Contraception: nothing  VTE prophylaxis: SCDs    - Disposition: likely home PPD2-3 (states she may want to leave later this evening but wants to speak with FOB first)      Nitin Cabrera M.D.  PGY-1, [facility]

## 2024-02-18 NOTE — CARE PLAN
The patient is Stable - Low risk of patient condition declining or worsening    Shift Goals  Clinical Goals: Pt will have stable vital signs and pain control  Patient Goals: Breastfeed  Family Goals: To support patient    Progress made toward(s) clinical / shift goals:    Problem: Knowledge Deficit - L&D  Goal: Patient and family/caregivers will demonstrate understanding of plan of care, disease process/condition, diagnostic tests and medications  Outcome: Progressing     Problem: Psychosocial - L&D  Goal: Patient's level of anxiety will decrease  Outcome: Progressing  Goal: Patient will be able to discuss coping skills during hospitalization  Outcome: Progressing  Goal: Patient's ability to re-evaluate and adapt role responsibilities will improve  Outcome: Progressing  Goal: Spiritual and cultural needs incorporated into hospitalization  Outcome: Progressing     Problem: Risk for Venous Thromboembolism (VTE)  Goal: VTE prevention measures will be implemented and patient will remain free from VTE  Outcome: Progressing     Problem: Risk for Infection and Impaired Wound Healing  Goal: Patient will remain free from infection  Outcome: Progressing  Goal: Patient's wound/surgical incision will decrease in size and heals properly  Outcome: Progressing     Problem: Risk for Fluid Imbalance  Goal: Patient's fluid volume balance will be maintained or improve  Outcome: Progressing     Problem: Risk for Injury  Goal: Patient and fetus will be free of preventable injury/complications  Outcome: Progressing     Problem: Pain  Goal: Patient's pain will be alleviated or reduced to the patient’s comfort goal  Outcome: Progressing     Problem: Discharge Barriers/Planning  Goal: Patient's continuum of care needs are met  Outcome: Progressing     Problem: Skin Integrity  Goal: Skin integrity is maintained or improved  Outcome: Progressing     Problem: Knowledge Deficit - Standard  Goal: Patient and family/care givers will demonstrate  understanding of plan of care, disease process/condition, diagnostic tests and medications  Outcome: Progressing     Problem: Knowledge Deficit - Postpartum  Goal: Patient will verbalize and demonstrate understanding of self and infant care  Outcome: Progressing     Problem: Psychosocial - Postpartum  Goal: Patient will verbalize and demonstrate effective bonding and parenting behavior  Outcome: Progressing     Problem: Altered Physiologic Condition  Goal: Patient physiologically stable as evidenced by normal lochia, palpable uterine involution and vitals within normal limits  Outcome: Progressing     Problem: Infection - Postpartum  Goal: Postpartum patient will be free of signs and symptoms of infection  Outcome: Progressing     Problem: Respiratory/Oxygenation Function Post-Surgical  Goal: Patient will achieve/maintain normal respiratory rate/effort  Outcome: Progressing     Problem: Bowel Elimination - Post Surgical  Goal: Patient will resume regular bowel sounds and function with no discomfort or distention  Outcome: Progressing     Problem: Early Mobilization - Post Surgery  Goal: Early mobilization post surgery  Outcome: Progressing     Problem: Pain - Standard  Goal: Alleviation of pain or a reduction in pain to the patient’s comfort goal  Outcome: Progressing     Problem: Fall Risk  Goal: Patient will remain free from falls  Outcome: Progressing       Patient is not progressing towards the following goals:

## 2024-02-18 NOTE — PROGRESS NOTES
1600- Bedside report received from GAURAV Perry.  Patient denied needs.  Patient assisted to latch infant.  1756- Fundus firm.  Lochia scant to light.  Patient up to the bathroom and voided 150 mls.

## 2024-02-18 NOTE — PROGRESS NOTES
Assessment complete. POC reviewed. Patient has no complaints at this time. Assisted pt with breastfeeding. Pt has call light at bedside and knows to call for assistance as needed.

## 2024-02-18 NOTE — DISCHARGE SUMMARY
Discharge Summary:     Date of Admission: 2024  Date of Discharge: 24      Admitting diagnosis:    1. Pregnancy @ 40w3d  2. Arrest of descent   3. CPD      Discharge Diagnosis:   1. Status post  for arrest of descent.  2. same    History reviewed. No pertinent past medical history.  OB History    Para Term  AB Living   1 1 1     1   SAB IAB Ectopic Molar Multiple Live Births           0 1      # Outcome Date GA Lbr Chalo/2nd Weight Sex Delivery Anes PTL Lv   1 Term 24 40w3d / 04:09 3.95 kg (8 lb 11.3 oz) M CS-LTranv EPI  LEE     History reviewed. No pertinent surgical history.  Shrimp flavor    Patient Active Problem List   Diagnosis    Normal pregnancy, second trimester    Primigravida in third trimester       Hospital Course:   Pt is a 34 y.o. now  who presented on 2024 for contractions. Labor induction performed with pitocin. AROM was performed. Patient dilated to complete and pushed for approximately 3 hours with no further descent of the fetal head despite adequate labor and adequate maternal pushing efforts. MD consented patient for  due to arrest of descent and patient agreed. Surgery was performed without complication. Single male infant was delivered via  at 40w3d. Apgars 8, 9 at 1 and 5 minutes respectively.  ml.     Postpartum course notable for early ambulation, well managed pain, tolerance of diet, spontaneous voiding, and appropriate feeding of infant. She has remained afebrile and blood pressure has been well controlled. All maternal questions and concerns addressed    Physical Exam:  Temp:  [36.1 °C (97 °F)-37.1 °C (98.8 °F)] 36.1 °C (97 °F)  Pulse:  [] 89  Resp:  [16-18] 16  BP: (105-114)/(70-76) 114/70  SpO2:  [96 %-97 %] 96 %  Physical Exam  General: well appearing, no apparent distress  Abdomen: soft, nontender, nondistended  Fundus: firm at level below umbilicus  Incision: dressing clean, dry, intact and healing  well  Perineum: Deferred  Extremities: symmetric, no peripheral edema, calves nontender    Current Facility-Administered Medications   Medication Dose    lactated ringers infusion      acetaminophen (Tylenol) tablet 1,000 mg  1,000 mg    Followed by    [START ON 2/21/2024] acetaminophen (Tylenol) tablet 1,000 mg  1,000 mg    oxyCODONE immediate-release (Roxicodone) tablet 5 mg  5 mg    oxyCODONE immediate release (Roxicodone) tablet 10 mg  10 mg    ondansetron (Zofran) syringe/vial injection 4 mg  4 mg    Or    ondansetron (Zofran ODT) dispertab 4 mg  4 mg    diphenhydrAMINE (Benadryl) tablet/capsule 25 mg  25 mg    Or    diphenhydrAMINE (Benadryl) injection 25 mg  25 mg    docusate sodium (Colace) capsule 100 mg  100 mg    tetanus-dipth-acell pertussis (Tdap) inj 0.5 mL  0.5 mL    measles, mumps and rubella vaccine (Mmr) injection 0.5 mL  0.5 mL    oxytocin (Pitocin) infusion (for post delivery)  125 mL/hr    oxytocin (Pitocin) injection 10 Units  10 Units    bisacodyl (Dulcolax) suppository 10 mg  10 mg    magnesium hydroxide (Milk Of Magnesia) suspension 30 mL  30 mL    prenatal plus vitamin (Stuartnatal 1+1) 27-1 MG tablet 1 Tablet  1 Tablet    simethicone (Mylicon) chewable tablet 125 mg  125 mg    calcium carbonate (Tums) chewable tab 1,000 mg  1,000 mg    ibuprofen (Motrin) tablet 800 mg  800 mg    Followed by    [START ON 2/21/2024] ibuprofen (Motrin) tablet 800 mg  800 mg       Recent Labs     02/16/24  0508 02/17/24  0448   WBC 22.9* 20.2*   RBC 4.67 3.22*   HEMOGLOBIN 14.9 10.5*   HEMATOCRIT 41.6 29.4*   MCV 89.1 91.3   MCH 31.9 32.6   MCHC 35.8* 35.7*   RDW 42.7 44.0   PLATELETCT 248 175   MPV 10.9 10.8         Activity/ Discharge Instructions::   Discharge to home  Pelvic Rest x 6 weeks  No heavy lifting x4 weeks  Call or come to ED for: heavy vaginal bleeding, fever >100.4, severe abdominal pain, severe headache, chest pain, shortness of breath,  N/V, incisional drainage, or other concerns.        Follow up:  [facility] in 1 week for incision check for  delivery.     Discharge Meds:   Current Outpatient Medications   Medication Sig Dispense Refill    acetaminophen (TYLENOL) 500 MG Tab Take 2 Tablets by mouth every 6 hours as needed for Mild Pain. 30 Tablet 0    ibuprofen (MOTRIN) 800 MG Tab Take 1 Tablet by mouth every 8 hours as needed for Mild Pain. 30 Tablet 0    docusate sodium 100 MG Cap Take 100 mg by mouth 2 times a day as needed for Constipation. 60 Capsule 0    oxyCODONE immediate-release (ROXICODONE) 5 MG Tab Take 1 Tablet by mouth every four hours as needed for Severe Pain for up to 5 days. 10 Tablet 0           Nitin Cabrera M.D.   PGY-1, [facility]

## 2024-02-19 NOTE — LACTATION NOTE
Follow up lactation visit:    Met with Christine and her baby boy to provide lactation assistance. Christine reports that is has been somewhat difficult to find comfortable positions to latch her baby in. She is latching more easily to the left breast than the right, but her left nipple is now bruised and tender.    Infant presents with hunger cues at this time. Using patient's breastfeeding pillow from home, comfortable positioning obtained in cross-cradle hold on right breast. Hand expression performed for easily expressible colostrum. Initial latch shallow, so latch break technique taught. Christine is able to un-latch and re-latch infant more deeply. Latch sustained and rhythmic, nutritive, suck pattern appreciated. Swallows audible.     [place] feeding and voiding/stooling patterns reviewed. Frequent skin-to-skin and cue-based feeding is encouraged; at least 8 feeds per 24 hours. Reviewed the milk making process, inclusive of supply and demand. Discussed signs of deep, asymmetric latch, and the importance of maintaining good latch to avoid pain/nipple damage and maximize milk transfer. Christine is to offer both breasts at each feeding, and baby should be allowed to self-limit time at breast. Sore nipple care reviewed.    Feeding plan:    Continue with cue-based breast feeding, at least once every three hours, for a total of 8+ feeds per 24 hours. Ensure deep latch is achieved for entirety of breast feeding sessions to prevent further nipple damage.    Follow up with [facility] for outpatient lactation support (referral sent).

## 2024-02-19 NOTE — PROGRESS NOTES
D/c instructions given, educated on pp edu for mom and baby. Pt understands and questions answered.

## 2024-02-20 ENCOUNTER — NON-PROVIDER VISIT (OUTPATIENT)
Dept: OBGYN | Facility: CLINIC | Age: 34
End: 2024-02-20
Payer: COMMERCIAL

## 2024-02-20 NOTE — PROGRESS NOTES
Summary: Christine has been exclusively breastfeeding since birth. Feeding all the time, reports breaks of 3-45 minutes between feedings. Offering both breasts, taking up to 1 hour to finish the feeding.   Today: Christine came in today to attend our Breastfeeding [place]. Wolf had lost 16.9oz (12%) at which time a consultation was recommended. Latched Get to the right breast, football hold, transferred 6mls in approximately 12 minutes. Did not attempt to latch to the left nipple due to extreme nipple trauma. Pumped with HGP for 16 minutes, yielded 8mls from the left and 2mls from the right. Dad fed pumped milk, pacing well. Offered an additional 20mls of formula, Get not interested. Content to be placed in car seat.   Plan: Feed Get every 2-3 hours throughout the day, up to 3-4 hours at night. For now, only practice latching on the right breast, up to 10 minutes. Discussed the option to not latch for some feedings, especially nighttime feedings. Offer expressed breastmilk and/or formula after or in place of breastfeeding. See guidelines below. Pump 8x every 24 hours, always after or in place of breastfeeding for 15 minutes.     Follow up:   Lactation appointment:  2024  Baby 's Provider appointment:  2024  Referrals: None    Maternal Diagnosis/Problem:  Cracked nipple, Sore nipples due to lactation, and Lactation Suppression   Infant Diagnosis/Problem:   difficulties feeding at the breast , Slow weight gain of , and Jaundice associated with breastfeeding     Subjective:     Christine Wilks is a 34 y.o. female here for lactation care. She is here today with her  her partner, Chacho and baby, Get .    Concerns:   Maternal: Latch on difficulties , Cracked/bleeding nipples , Nipple pain , Weight check, Infant feeding evaluation, and Breastfeeding questions   Infant: Baby cries excessively, Baby doesn't sleep, Sleepy baby, and Baby always seems  hungry    HPI:   Pertinent  history: c/section    Mother does not have a history of advanced maternal age, GDM, hypertension prior to pregnancy, GHTN, insulin resistance, multiple gestation, PCOS, thyroid disease, auto immune disease , and placenta encapsulation    Breast changes in pregnancy: Minimal  Breast surgeries: No    FEEDING HISTORY:    Previous Breastfeeding History: First baby.   Hospital Course: Exclusively  with reported nipple damage.   Currently 2024: Feeding all the time, reports breaks of 3-45 minutes between feedings. Offering both breasts, taking up to 1 hour to finish the feeding.     Both breasts: Yes    Supplement: None     Breast Pumping:  Not Pumping     Maternal ROS:  Constitutional: No fever, chills. Feeling well  Breasts: No soreness of breasts and Soreness of nipples  Psychiatric: Feels exhausted and Feels overwhelmed  Mental Health: No mention of feeling irritable, agitated, angry, overwhelmed, apathetic, appetite changes, exhausted nor having sleep changes outside infant feeds/demands.  Current Outpatient Medications on File Prior to Visit   Medication Sig Dispense Refill    acetaminophen (TYLENOL) 500 MG Tab Take 2 Tablets by mouth every 6 hours as needed for Mild Pain. 30 Tablet 0    ibuprofen (MOTRIN) 800 MG Tab Take 1 Tablet by mouth every 8 hours as needed for Mild Pain. 30 Tablet 0    docusate sodium 100 MG Cap Take 100 mg by mouth 2 times a day as needed for Constipation. 60 Capsule 0    oxyCODONE immediate-release (ROXICODONE) 5 MG Tab Take 1 Tablet by mouth every four hours as needed for Severe Pain for up to 5 days. 10 Tablet 0    Prenatal Vit-Fe Fumarate-FA (PRENATAL 1 PLUS 1 PO) Take  by mouth.       No current facility-administered medications on file prior to visit.      No past medical history on file.      Objective:     Maternal Physical Lactation Exam  General: No acute distress  Breasts: Symmetrical  and Soft  Nipples: scabbed/crusting and  across face of nipple  Psychiatric: Normal mood and affect. Her behavior is normal. Judgment and thought content normal.  Mental Health: Did exhibit exhibit sadness, crying and feeling overwhelmed. Did NOT exhibit agitation or hypervigilance.    Assessment/Plan & Lactation Counseling:     Infant Exam on Infant Chart    Infant Weight History:   02/16/2024: 8# 11oz  02/20/2024: 7# 10.1oz    Infant Intake at Breast:  R  6mls  L  Not Offered  Total: 6mls  Milk Transfer at this feeding:   Effective breastfeeding for volume available      Pumped  Type of Pump: Platinum   Quantity Pumped:    Total: 10mls  Initiation of Feeding: Infant initiates, fell asleep shortly after latching  Attachment Achieved: rapidly  Nipple shield: N/A      Suck Pattern at the Breast: Suck burst and normal rest and Chewing  Suck Pattern on the Bottle: Suck burst and normal rest, dad paces well  Behavior Following Observed Feeding: content  Nipple Pain: Yes  Nipple Pain From:Nipple damage from accumulated microtrauma which lowered failure strength resulting in sudden damage     Latch: Assisted latch  Suckling/Feeding: attaches, baby falls asleep, baby fed effectively, baby roots, elicits GODWIN, and frequent pauses  Sucking strength: Strong  Sucking Rhythm Coordinated   Compression: WNL    Once latched, baby fell into a mature and fully integrated SSB pattern.    Swallowing No difficulty noted  If functional feeding, it is quiet, rhythmic, coordinated, organized, effeicent safe, satisfying and pleasurable for both parent and baby? No  Milk Supply Available: low and delayed    Low Milk Supply:   Likely due to: delay in lactogenesis II, ineffective or infrequent breast stimulation or milk removal, and possible breast hypoplasia      MATERNAL PERSONALIZED BREASTFEEDING PLAN  (Babies and parents change and adapt  or mal-adapt, to each other, so a plan today is only as good as it facilitates the families goals, follow up is key in a dynamic process as  breastfeeding.)  Discussed concerns and symptoms as listed above in assessment and guidance summarized below. Shared decision making was used between myself and the family for this encounter, home care, and follow up. Topics advised, taught and or reviewed included:   4th Trimester: The 12-week period immediately after mom has had the baby. Not everyone has heard of it, but every mother and their  baby will go through it. It is a time of great physical and emotional change as the baby adjusts to being outside the womb, and the family adjusts to new life as parents  During the fourth trimester, one can expect fussiness and crying from the baby and very likely exhaustion for the family.  babies are learning to adjust to life outside the womb where it was warm and squishy!  There is a lot of misinformation about babies and their needs, and parents are often encouraged to ignore baby's signals. Bad idea. Babies are “half-baked” at birth and have much to learn with the help of physical and emotional support from caregivers. Taking care of a baby's needs is an investment that pays off with a happier, healthier child and adult.  It can take weeks or even months for your body to feel totally normal again. There is a major hormonal upheaval experienced by moms in the first few weeks after birth, because their bodies are shifting from many pregnancy hormones to a more normal hormonal make-up.  These first three months with baby earth side is a delicate time. Honor it with a mindful dose of support. Mindful Mamma's is an argenis that may help.   Self-Compassion through Relational Support and Interaction.   Be kind to ourself. This is the first step in reducing anxiety and depression. Pay attention to how you are doing.   What do you need? Food, Rest, Sleep, Support, to Laugh/giggle: who will you ask today? They want to help you. We want to help you.   How do you stop your self-blame, or your self-criticism?   Get out  "of the house each day, walk or drive somewhere or ask a friend to drive you,  a \"treat\" at a drive through.   Mood and Anxiety Disorders: Having a new baby can be joyful time for some new moms, but a difficult time for others. For all, it is a time of profound physical and emotional change. Balancing baby, family, partners and friends, work, pets, and preexisting or new life stressors as well as sleep deprivation can be extremely challenging. Untreated depression, sleep exhaustion, and anxiety are each a challenge that must be addressed and in addition can contribute to early cessation of breastfeeding. It is important both for the mental health and physical health of new moms and babies to get on the path to feeling better and if therapeutic support is needed, specific resources are available.   Sleep or Lack of: Human Giver Syndrome (moms) tells us it’s “self-indulgent” to rest and sleep, which makes as much sense as believing it’s weak or self indulgent to breathe. We discussed strategies to manage restorative sleep, although short amounts, significant to the mental health of the mother. The principle follows:  Mom goes to sleep right after 8pm +/- feeding  Partner covers first late evening feeding (10pm/11pm), settles baby,  then goes to bed  Mom covers next feeding 1am /2am, partner sleeps  Next feeding share  Milk Supply is dependent on glandular tissue development, hormonal influences, how many times the baby/pump removes milk and how well the breasts are emptied in a 24 hour period. This is a biological reality that we can NOT work around. There's no magic trick, tea, food, drink, cookie or supplement that will increase your milk supply. One  must  effectively remove milk to continue to make and maximize milk. In the early days and weeks that can be 8+ times in 24 hours. For older babies, on average 6-7 + times in 24 hours.    Hydration: Staying hydrated is important however lack of hydration " is usually not a cause of significantly low milk production.  Everyone needs a different amount of water, depending on their activity and diet. A high salt and/or high-protein diet, high physical activity, or very warm weather/sweating will require more fluids. A person eating a diet high in veggies and fruit, with a lack of physical activity will require fewer fluids. There is no magic number for the # of ounces of water each day.The best way to know that you are well hydrated is by looking at your urine.  Urine should look clear to light pale yellow, and you should need to pee at least every 3 to 4 hours unless you have a large bladder capacity.  Herbs and Medications: A galactagogue is an herb or medication taken by a breastfeeding mother to increase her milk supply. We know that for centuries mothers around the world have sought out remedies to increase their milk supply. However, there is limited research on the safety and effectiveness of herbal galactagogues, which makes it hard for us to endorse them. It is not known if any of these herbs are truly effective, and it is difficult to predict how a specific herbal galactagogue will affect an individual, requiring “trial and error” in many situations. When effective, results are generally seen within 24-72 hours of starting an herbal galactagogue. Many of these herbs are used to decrease high blood sugar. If you are diabetic or have problems with low blood sugar, or thyroid disease you may not be a candidate for herbs. Not all women can increase their low milk supply with a galactagogue due to the many underlying causes of low milk production.  When taking a galactagogue, remember that frequent milk removal is still the most effective way to increase supply.   Feeding:   Infant difficulty with feeding, slow growth. Guidelines to follow:  Feed your baby every 1.5-3 hours, more often if baby acts hungry.   Awaken baby for feeding if going over 3 hours in the day.    Until back to birth weight, ONE four hour at night is acceptable if has had 8 prior feedings in 24 hours.    Daily goal: 8-12 feedings per 24 hours.   Supplement:   Supplement with expressed milk and formula after or in place of breastfeeding.  Tuesday: 40mls  Wednesday: 50mls  Thursday: 60mls  Friday - Friday: 60-75mls  If at anytime Get needs more milk, offer an additional 10-15mls  Proper powder formula preparation: https://www.cdc.gov/nutrition/downloads/prepare-store-powered-vlhcqm-ifxlvsf-921.pdf.   For babies under 2 months: https://www.cdc.gov/cronobacter/infection-and-infants.html  Pacing the feeding:  A slow flow nipple helps, but how you feed the baby is more important.  How you are  positioning the bottle can compensate for a faster flow nipple.  When bottle-feeding, the baby should control how much is consumed at a feeding.  Holding the baby in an upright position with the bottle horizontal ensures that the baby gets milk only when sucking.  Here is a nice video demonstrating this concept of paced bottle feeding,  https://www.Coupoplaces.com/watch?v=AqAQQ1sEG9F Think baby led, not parent led.  Pacifier Use:  The American Academy of Pediatrics' Position Paper reports: Although we recommend a conservative approach regarding pacifier use, we do not endorse a complete ban on the use of pacifiers, nor do we support an approach that induces parental guilt concerning their choices about the use of pacifiers. Pacifier use and breastfeeding in term and  newborns- a systematic review and meta-analysis from the  J of Pediatrics Published online 2022. Has found that when pacifiers are used among individuals who have been counseled on the risks, do not interfere with breastfeeding exclusivity or duration. This is a  parental choice.   Positioning Techniques   Pillow used: Jessica Recinos  Suggested positions Cross cradle and Football  Fine tune position by making sure your fingers beneath  "the breast as well as your bra, are out of the way of your baby's chin.  Positioning: See http://globalhealthmedia.org/portfolio-items/positions-for-breastfeeding/?fzbeasiasUN=12581  Latch on Techniques   Aim is an asymmetric deep latch.  First make yourself comfortable. Sit with knees bent (unless lying down), feet on a stool if needed. You will support your baby, and pillows will be used to support YOU. You want your baby's belly facing your breast/body (belly to belly). If your baby is extremely fussy and crying, do skin-to-skin for a while until he or she calms down, then try (or try again).   Fine tune latch:  By holding your baby more securely at the breast, assisting your baby to stay attached by:  Support your baby with your hand behind the shoulder blades (NOT THE HEAD).  1. Align your baby's NOSE with your nipple  2. Your baby's chin should be the first part of his or her body to touch your breast  3. Tickle the baby's upper lip or nose with your nipple  4. When your baby's mouth is WIDE OPEN, swiftly bring your baby's mouth over your nipple/areola.  Steps 2 and 3 could be slightly reversed order or essentially happening at the same time.  Bringing your baby to your breast, not breast to the baby  Your baby's cheek to touch breast securely, nose tipped back  Hold your baby firmly in place so when your baby forgets to suck and picks it back up again your baby is in the correct spot. You will be extinguishing behavior and replacing it with a deeper latch to stimulate suck and provide satisfaction at the breast.  Your baby needs as much breast as deep in the mouth as possible to allow your nipples to heal and for you more importantly to maximize efficiency at the breast  If that doesn’t help, you should make an appointment with me to re-evaluate.   Latch is asymmetrical, leading with the chin, getting the baby below the breast, as if offering a large \"deli valeriy sandwich\".  Here is a video from IARASHID on the " asymmetric latch       https://www.youFifth Generation Technologies [employer] Privateube.com/watch?v=0I-ZFw5Mi69  Pumping Guidelines:  Both breasts 8x in 24 hours  Bra    Consider a hands free bra - Simple Wishes is recommended.  Type of Pump:  Platinum  Ameda [place] Settings http://www.Citydeal.de.Tiny Pictures/healthcare-professionals/videos/ameda-platinum-with-hygienikit-video   Speed: Start at 80 then turn down to 60 after 1.5-2 minutes when you see milk in the flange channel  Suction: To comfort, goal of  30-50%. NEVER to discomfort.   Today you were at 15%  Power Pumping is only rarely indicated as the response is not significant or zero over 24 hours  How long will vary woman to woman, typically 8-15 minutes, or 1-2 minutes after flow slows  Flange Fit: Freely moving nipple in the tunnel with some movement of the areola.  Caution with Breast Massage: The word “Massage” means different things in the breastfeeding world. It is described and interpreted in so many different ways and unfortunately potentially harmful. The hands can be safe on a breast and  gentle to help in many ways but they also can be damaging when used in the wrong way.  Lymphatic drainage massage is appropriate,  open hand , lightly stroking only, and can be highly effective. The massage advice to knead , massage deeply , vibrate with marketed tools is  most concerning. Be gentle with this gland to not increase inflammation.   Storage (Acceptable guidelines for healthy term babies)  10 hours at room temp including your pieces, may rinse but not mandatory  8 days refrigerator, don't need  to refrigerate right away if using fresh pumped milk at the next feeding  Freezer 1 year  Deep freezer 2 years  American Academy or Pediatrics has said you may mix different temperature milks.   If baby drinks breast milk from a fresh or refrigerated bottle of breast milk,  you may return the unused portion to the refrigerator and use once at next feeding.   Increasing supply besides Galactogogue's and  Pumping:  Warmth  Relaxation   Physical, auditory narratives  Childbirth relaxation Techniques  Acupuncture and acupressure  Brandin Jones at [facility] Acupuncture  Shoulder Massages  Take a nap  Nipple care:    May apply breastmilk  Moist-oily ointment after feeding/pumping, ie Lanolin nipple butter, coconut or olive oil, if desired/needed 2-3 times/day until nipples are healed  You do not need to wash this off before pumping or feeding the babyMedela Hydrogels, Lansinoh Soothies and Ameda comfort gels are all the same, different companies  Silverettes (Knockoffs not recommended as they may contain nickel which is allergenic)   Put a few drops of milk in the cup  Place over nipple, no lotions or creams  Allow bra to hold them in place  Once the wound is healed, there is no scabbing, may stop using as the silver can cause damage to the tissue and the milk in the cup can macerate the nipple. You can change to hydrogels or nipple butter.   Breast Care: Engorgement  or vasospasm  Breast rest - No Massage, don't overfeed of over pump, down regulate production if needed  Advil 800mg every 8 hours for 48 hours with food  Ice - 10 minutes  after feeding then every 30 minutes or as desired for repeating the ice. Don't put the ice directly on the skin.  May add Tylenol 1000mg every 8 hours for 48 hours in between the Advil dosing if needed for pain.  Answers to FAQs: https://www.infantrisk.com/category/breastfeeding  Alcohol & Breastfeeding: What's your time-to-zero?  Cough & Cold Medications while Breastfeeding  Vitamin D Supplementation and Breastfeeding  Antidepressant Use While Breastfeeding: What should I know?  Breastfeeding, Caffeine, and Energy Drinks  Recommended resources:  Physicians guide to breastfeeding, must up to date and accurate information available on breastfeeding. https://physicianguidetobreastfeeding.org/  Dads  Step #1 (for Partners): If possible, arrange your life so you can engage with your baby early  and often                                                                                 Step #2 (for Moms): Encourage your partner to be hands-on - and let him handle things differently.                                                                                            Step #3 (for Partners): Realize that “your way” of parenting is essential for your child. https://doi.org/10.1093/cercor/buan965    Spoiler alert!  Parenting can be really hard. There is so much to learn when it comes to caring for small humans.  It can feel lonely and unsettling.  Our groups, are parenting and feeding support groups that's all about feeding/growing mauricio.   Babies welcome.   Questions expected.   We will help you find your village!   Online Breastfeeding [place] Online Group  Thursdays, 12 noon - 1 pm Facilitated by Sivakumar SQUIRES  Zoom Link: [email or web address] OfficeHugh Chatham Memorial HospitalVirgil SecuritySloop Memorial HospitalMango [email or web address]   Passcode [id number]   [phone]  Connect with other mothers:  Facebook:   [employer] Breastfeeds: https://www.Emerging Threats.com/[place].breastfeeds/  Well-Nourished Babies (Private group for questions and support): https://www.Emerging Threats.com/groups/086570871956843/  Online Breastfeeding [place] Online Group  Thursdays, 12 noon - 1 pm Facilitated by Sivakuamr SQUIRES  Zoom Link: [email or web address]   Passcode [id number]   [id number]  Breastfeeding [place] including opportunity to weight your baby and do before and after weights   Tuesdays 10am - 11am. Women's Health at [address] [place] [address], [address], 3rd floor conference room  Check your baby's weight, do a feeding and see how your baby is growing, visit with other mothers, plan on a walk or coffee date after group.  Please download Growth: Baby and Child for Apple or Child Growth Tracker for Meldium if you would like to  document and follow your baby's growth curve.  Due to space  limitations - limit strollers please (New c/section moms please use your stroller).  We would love to have dads stay, but moms won't breastfeed if there are men in the room, sorry.  The room is generally scheduled for another event following group.  Please take all diapers with you   PSI ONLINE SUPPORT GROUPS  Postpartum Support International (PSI) support groups are conducted using a ujgh-dn-jlot support model and are not intended for those experiencing a mental health crisis. Groups are 90 minutes (1.5 hours) in length. The first ~30 minutes is providing information, education, and establishing group guidelines. The next ~60 minutes is “talk time,” in which group members share and talk with each other. Group members must be present for the group guidelines before joining in the discussion or “talk time.”       In Conclusion:   Managing breastfeeding and milk supply is very dynamic,can be a complex and intimate journey, and is not one size fits all. When obstacles present themselves, it takes confidence, persistence and support. The rights of the child include optimal nutrition and mothers need help to make informed decisions. You  and your baby have been screened for biological, psychological, and social risk factors that might interfere with achieving your goals.  Support is critical. We want the family thriving not just tolerating life. You are now the focus of our Breastfeeding Medicine team; we are here to support your decisions and vision.     Follow up   Please call [phone] our voicemail line or the front office at [phone] to scheduled your next appointment.    A total of 75 minutes, including infant assessment time,  was spent preparing to see the patient, obtaining and reviewing separately obtained history, performing a medically appropriate examination and evaluation, counseling and educating the family, referring and communicating with other health care professionals, documenting clinical information  in the electronic health record, independently interpreting weighted feeds and infant growth results, communicating these results to the family and care coordination as detailed in the above note.       Denisse Webster

## 2024-02-23 ENCOUNTER — GYNECOLOGY VISIT (OUTPATIENT)
Dept: OBGYN | Facility: CLINIC | Age: 34
End: 2024-02-23
Payer: COMMERCIAL

## 2024-02-23 VITALS — SYSTOLIC BLOOD PRESSURE: 119 MMHG | DIASTOLIC BLOOD PRESSURE: 75 MMHG | BODY MASS INDEX: 31.28 KG/M2 | WEIGHT: 171 LBS

## 2024-02-23 DIAGNOSIS — Z09 POSTOP CHECK: ICD-10-CM

## 2024-02-23 PROCEDURE — 3078F DIAST BP <80 MM HG: CPT | Performed by: OBSTETRICS & GYNECOLOGY

## 2024-02-23 PROCEDURE — 99024 POSTOP FOLLOW-UP VISIT: CPT | Performed by: OBSTETRICS & GYNECOLOGY

## 2024-02-23 PROCEDURE — 3074F SYST BP LT 130 MM HG: CPT | Performed by: OBSTETRICS & GYNECOLOGY

## 2024-02-23 NOTE — PROGRESS NOTES
S/  No complaints  O/Vss Afebrile      Incision-healing well-Staples removed Steri-Strips placed  IMP/ Normal Post-Op  PLAN/Follow up at 6 week Post partum

## 2024-02-26 ENCOUNTER — OFFICE VISIT (OUTPATIENT)
Dept: OBGYN | Facility: CLINIC | Age: 34
End: 2024-02-26
Payer: COMMERCIAL

## 2024-02-26 DIAGNOSIS — O92.29 SORE NIPPLES DUE TO LACTATION: ICD-10-CM

## 2024-02-26 DIAGNOSIS — O92.70 LACTATION PROBLEM: ICD-10-CM

## 2024-02-26 DIAGNOSIS — O92.5 LACTATION SUPPRESSION: ICD-10-CM

## 2024-02-26 PROCEDURE — 99215 OFFICE O/P EST HI 40 MIN: CPT | Performed by: NURSE PRACTITIONER

## 2024-02-26 RX ORDER — BREAST PUMP
EACH MISCELLANEOUS
Qty: 1 EACH | Refills: 0 | Status: SHIPPED | OUTPATIENT
Start: 2024-02-26

## 2024-02-26 NOTE — PROGRESS NOTES
Summary: Christine has been pumping with the platinum after each breastfeeding every 3 hours and instead of breastfeeding, 8x/day at 22% suction. She is getting 20-30ml of milk at this time. Baby breastfeeds on each breast about 5 minutes, falls off often, has a difficulty time opening his mouth wide but is content to be there and intermittently suck. He is taking 60-80ml each feeding and has good interval growth. Her left nipple healed and the right became damage. Mom does use a brace at night for carpel tunnel syndrome, the left wrist is more painful.  Today: Assisted latch for asymmetry and depth, Get removed 9ml from the left breast of the 19ml available. Assisted to the right breast in football as moms wrist is painful and latch was easier in that position. Shirleywillie removed another 9ml of the 21ml available, removing about 50% of the available milk. He had fed and mom had pumped prior to coming.   Plan: Continue with not breastfeeding at night, offering both breasts at a feeding 4-5x, not every time is ok. Top off with 60-80ml per feeding if not breastfeeding and 40-50ml if  with sucking. Pump after to fully empty. Sleep discussion for mom and dad on board. Grandma leaving soon and dad back to work.   Follow up:   Lactation appointment:  3/8/24 8am  Baby 's Provider appointment: 14 Day Well Child Check  24  Referrals: None    Maternal Diagnosis/Problem:  Sore nipples due to lactation, Lactation Suppression , and Lactation Problem  Infant Diagnosis/Problem:   difficulties feeding at the breast     Subjective:     Christine Wilks is a 34 y.o. female here for lactation care. She is here today with her baby, father of baby (Chacho), and Grandma.    Concerns:   Maternal: Latch on difficulties , Nipple pain , Feeling that there is not enough milk , Weight check, Infant feeding evaluation, and Breastfeeding questions   Infant: Baby not interested    HPI:   Pertinent  history:  c/section and primary 40.3 weeks    Mother does not have a history of advanced maternal age, GDM, hypertension prior to pregnancy, GHTN, insulin resistance, multiple gestation, PCOS, thyroid disease, auto immune disease , placenta encapsulation, and breast surgery      Breast changes in pregnancy: Yes by dad  Breast surgeries: No    FEEDING HISTORY:    Previous Breastfeeding History: First baby.   Hospital Course: Exclusively  with reported nipple damage.   Prior to consultation on 2/20/2024: Feeding all the time, reports breaks of 3-45 minutes between feedings. Offering both breasts, taking up to 1 hour to finish the feeding.   Currently  2/26/2024   Christine has been pumping with the platinum after each breastfeeding every 3 hours and instead of breastfeeding, 8x/day at 22% suction. She is getting 20-30ml of milk at this time. Baby breastfeeds on each breast about 5 minutes, falls off often, has a difficulty time opening his mouth wide but is content to be there and intermittently suck. He is taking 60-80ml each feeding and has good interval growth. Her left nipple healed and the right became damage. Mom does use a brace at night for carpel tunnel syndrome, the left wrist is more painful.  Both breasts: Yes    Supplement: Expressed breast milk and Formula  Quantity: 60-80ml    Breast Pumping:  Frequency: 8x/day  Quantity Obtained: 20-30ml  Type of Pump: Platinum and Haakaa  Flange size/type: 25mm  NO pain with pumping    Maternal ROS:  Constitutional: No fever, chills. Feeling well  Breasts: No soreness of breasts and Soreness of nipples  Psychiatric: No mood changes  Mental Health: No mention of feeling irritable, agitated, angry, overwhelmed, apathetic, appetite changes, exhausted nor having sleep changes outside infant feeds/demands.  Current Outpatient Medications on File Prior to Visit   Medication Sig Dispense Refill    acetaminophen (TYLENOL) 500 MG Tab Take 2 Tablets by mouth every 6 hours as  needed for Mild Pain. 30 Tablet 0    ibuprofen (MOTRIN) 800 MG Tab Take 1 Tablet by mouth every 8 hours as needed for Mild Pain. 30 Tablet 0    docusate sodium 100 MG Cap Take 100 mg by mouth 2 times a day as needed for Constipation. 60 Capsule 0    Prenatal Vit-Fe Fumarate-FA (PRENATAL 1 PLUS 1 PO) Take  by mouth.       No current facility-administered medications on file prior to visit.      No past medical history on file.    Objective:     Maternal Physical Lactation Exam  General: No acute distress  Breasts: Symmetrical , Soft, Plugged Duct - no evidence, and Mastitis  - no S/S  Nipples: abraded and erythematous greater on the right, left healed from a week ago  Psychiatric: Normal mood and affect. Her behavior is normal. Judgment and thought content normal.  Mental Health: Did NOT exhibit sadness, crying, feeling overwhelmed, agitation or hypervigilance.    Assessment/Plan & Lactation Counseling:     Infant Exam on Infant Chart    Infant Weight History:   02/16/2024: 8# 11oz  02/20/2024: 7# 10.1oz Group  8/23/24  8#2.3oz Peds  2/26/2024 8#7.5oz    Infant Intake at Breast:      Total: 18ml  Milk Transfer at this feeding:   Ineffective breastfeeding; not able to transfer a full feed from breast r/t not draining the breast     Pumped:   Type of Pump: Platinum   Quantity Pumped:    Total: 20  Initiation of Feeding: Infant initiates  Attachment Achieved: rapidly  Nipple shield: N/A       Difficult Latch Due To:   Position and latch  Suck Pattern at the Breast: Suck burst and normal rest intermittently with stimulation  Suck Pattern on the Bottle: Not Indicated   Behavior Following Observed Feeding: sleeping  Nipple Pain From:Contact forces of the tongue causing nipple strain resulting in damage     Latch: Assisted latch and Shallow latch  Suckling/Feeding: attaches, baby roots, elicits GODWIN, frequent pauses, loses suction, and rhythmic  Sucking strength: Moderate Strong  Sucking Rhythm Coordinated   Compression: WNL     Once latched, baby fell into a more but inconsistent mature and fully integrated SSB pattern.    Swallowing No difficulty noted  If functional feeding, it is quiet, rhythmic, coordinated, organized, effeicent safe, satisfying and pleasurable for both parent and baby?  No  Milk Supply Available: low      MATERNAL PERSONALIZED BREASTFEEDING PLAN  (Babies and parents change and adapt  or mal-adapt, to each other, so a plan today is only as good as it facilitates the families goals, follow up is key in a dynamic process as breastfeeding.)  Discussed concerns and symptoms as listed above in assessment and guidance summarized below. Shared decision making was used between myself and the family for this encounter, home care, and follow up. Topics advised, taught and or reviewed included:   4th Trimester: The 12-week period immediately after mom has had the baby. Not everyone has heard of it, but every mother and their  baby will go through it. It is a time of great physical and emotional change as the baby adjusts to being outside the womb, and the family adjusts to new life as parents  During the fourth trimester, one can expect fussiness and crying from the baby and very likely exhaustion for the family.  babies are learning to adjust to life outside the womb where it was warm and squishy!  There is a lot of misinformation about babies and their needs, and parents are often encouraged to ignore baby's signals. Bad idea. Babies are “half-baked” at birth and have much to learn with the help of physical and emotional support from caregivers. Taking care of a baby's needs is an investment that pays off with a happier, healthier child and adult.  It can take weeks or even months for your body to feel totally normal again. There is a major hormonal upheaval experienced by moms in the first few weeks after birth, because their bodies are shifting from many pregnancy hormones to a more normal hormonal  "make-up.  These first three months with baby earth side is a delicate time. Honor it with a mindful dose of support. Mindful Mamma's is an argenis that may help.   Self-Compassion through Relational Support and Interaction.   Be kind to ourself. This is the first step in reducing anxiety and depression. Pay attention to how you are doing.   What do you need? Food, Rest, Sleep, Support, to Laugh/giggle: who will you ask today? They want to help you. We want to help you.   How do you stop your self-blame, or your self-criticism?   Get out of the house each day, walk or drive somewhere or ask a friend to drive you,  a \"treat\" at a drive through.   Mood and Anxiety Disorders: Having a new baby can be joyful time for some new moms, but a difficult time for others. For all, it is a time of profound physical and emotional change. Balancing baby, family, partners and friends, work, pets, and preexisting or new life stressors as well as sleep deprivation can be extremely challenging. Untreated depression, sleep exhaustion, and anxiety are each a challenge that must be addressed and in addition can contribute to early cessation of breastfeeding. It is important both for the mental health and physical health of new moms and babies to get on the path to feeling better and if therapeutic support is needed, specific resources are available.   Triple Feeding and its sustainability and its impact on the mother baby relationship  Sleep or Lack of: Human Giver Syndrome (moms) tells us it’s “self-indulgent” to rest and sleep, which makes as much sense as believing it’s weak or self indulgent to breathe. We discussed strategies to manage restorative sleep, although short amounts, significant to the mental health of the mother. The principle follows:  Mom goes to sleep right after 8pm +/- feeding  Partner covers first late evening feeding (10pm/11pm), settles baby,  then goes to bed  Mom covers next feeding 1am /2am, partner " sleeps  Next feeding share  Milk Supply is dependent on glandular tissue development, hormonal influences, how many times the baby removes milk and how well the breasts are emptied in a 24 hour period. This is a biological reality that we can NOT work around. If, for any reason, your baby is not latching, or you are not able to nurse, then it is important for you to remove the milk instead by pumping or hand expression.  There's no magic trick, tea, food, drink, cookie or supplement that will increase your milk supply. One  must  effectively remove milk to continue to make and maximize milk. In the early days and weeks that can be 8+ times in 24 hours. For older babies, on average 6-7 + times in 24 hours.    Hydration: Staying hydrated is important however lack of hydration is usually not a cause of significantly low milk production.  Everyone needs a different amount of water, depending on their activity and diet. A high salt and/or high-protein diet, high physical activity, or very warm weather/sweating will require more fluids. A person eating a diet high in veggies and fruit, with a lack of physical activity will require fewer fluids. There is no magic number for the # of ounces of water each day.The best way to know that you are well hydrated is by looking at your urine.  Urine should look clear to light pale yellow, and you should need to pee at least every 3 to 4 hours unless you have a large bladder capacity.  Herbs and Medications: A galactagogue is an herb or medication taken by a breastfeeding mother to increase her milk supply. We know that for centuries mothers around the world have sought out remedies to increase their milk supply. However, there is limited research on the safety and effectiveness of herbal galactagogues, which makes it hard for us to endorse them. It is not known if any of these herbs are truly effective, and it is difficult to predict how a specific herbal galactagogue will affect an  individual, requiring “trial and error” in many situations. When effective, results are generally seen within 24-72 hours of starting an herbal galactagogue. Many of these herbs are used to decrease high blood sugar. If you are diabetic or have problems with low blood sugar, or thyroid disease you may not be a candidate for herbs. Not all women can increase their low milk supply with a galactagogue due to the many underlying causes of low milk production.  When taking a galactagogue, remember that frequent milk removal is still the most effective way to increase supply.   Feeding:   Infant feeding well given current interval growth, guidelines to follow:  Feed your baby every 1.5-3 hours, more often if baby acts hungry.   Awaken baby for feeding if going over 3 hours in the day.   Daily goal: 8-12 feedings per 24 hours.    Given infants weight you may allow baby to go longer at night but that generally means shorter durations in the day.   Supplement:   Supplement with expressed milk  Supplement with formula   Proper powder formula preparation: https://www.cdc.gov/nutrition/downloads/prepare-store-powered-infant-formula-508.pdf.   Positioning Techniques for Bare Breast  Pillow used: Jessica Headley Nu Jorje  Suggested positions Cross cradle and Football  Fine tune position by making sure your fingers beneath the breast as well as your bra, are out of the way of your baby's chin.  Positioning: See http://globalhealthmedia.org/portfolio-items/positions-for-breastfeeding/?mwxscwclpFM=89290  Latch on Techniques for Bare Breast.   Aim is an asymmetric deep latch.  Fine tune latch:  By holding your baby more securely at the breast, assisting your baby to stay attached by:  Support your baby with your hand behind the shoulder blades (NOT THE HEAD).  1. Align your baby's NOSE with your nipple  2. Your baby's chin should be the first part of his or her body to touch your breast  3. Tickle the baby's upper lip or nose with your  "nipple  4. When your baby's mouth is WIDE OPEN, swiftly bring your baby's mouth over your nipple/areola.  Steps 2 and 3 could be slightly reversed order or essentially happening at the same time.  Bringing your baby to your breast, not breast to the baby  Your baby's cheek to touch breast securely, nose tipped back  Hold your baby firmly in place so when your baby forgets to suck and picks it back up again your baby is in the correct spot. You will be extinguishing behavior and replacing it with a deeper latch to stimulate suck and provide satisfaction at the breast.  Your baby needs as much breast as deep in the mouth as possible to allow your nipples to heal and for you more importantly to maximize efficiency at the breast  If that doesn’t help, you should make an appointment with me to re-evaluate.   Latch is asymmetrical, leading with the chin, getting the baby below the breast, as if offering a large \"deli valeriy sandwich\".  Here is a video from RUBI on the asymmetric latch       https://www.Intelligent InSitesube.com/watch?v=0I-IKf2Sg53  \"Double feeding\" as baby prepares for more robust feeding sessions  If triple feeing, FIRST decide what you can do at that feeding and if you decide to double feed -pump and bottle, that is sufficient.  If you are going to offer the breast at that feeding:  nurse first and limit time on the breasts to 20+/- min total  finish with bottle  pump afterwards to finish emptying your breasts which will help increase milk supply  As baby becomes more effective, evidenced by not pumping much milk after a breastfeeding, we will create a plan to decrease pumping.  Use your own pumped milk, donor human milk, or formula.  Pumping Guidelines:  Both breasts 8 times in 24 hours  Type of Pump:    Ameda Graham Settings http://www.[employer].THUBIT/healthcare-professionals/videos/ameda-platinum-with-hygienikit-video   Speed: Start at 80 then turn down to 60 after 1.5-2 minutes when you see milk in the flange " channel  Suction: To comfort, goal of  31%. NEVER to discomfort.   Today you were at 22%  Spectra Settings if you order this pump  Press letdown button when first starting         Cycle: 70 / Vacuum: L1 - L 5 (To comfort)  Once milk lets down, press letdown button again  Cycle: 54 / Vacuum: L1 - L12 (To comfort)  Power Pumping is only rarely indicated as the response is not significant or zero over 24 hours  How long will vary woman to woman, typically 8-15 minutes, or 1-2 minutes after flow slows  Flange Fit: Freely moving nipple in the tunnel with some movement of the areola.  Today's evaluation indicates appropriate flange  Caution with Breast Massage: The word “Massage” means different things in the breastfeeding world. It is described and interpreted in so many different ways and unfortunately potentially harmful. The hands can be safe on a breast and  gentle to help in many ways but they also can be damaging when used in the wrong way.  Lymphatic drainage massage is appropriate,  open hand , lightly stroking only, and can be highly effective. The massage advice to knead , massage deeply , vibrate with marketed tools is  most concerning. Be gentle with this gland to not increase inflammation.   Increasing supply besides Galactogogue's and Pumping:  Warmth  Relaxation   Physical, auditory narratives  Childbirth relaxation Techniques  Brandin Jones at [facility] for chinese herbs  Nipple care:    May apply breastmilk  Moist-oily ointment after feeding/pumping, ie Lanolin nipple butter, coconut or olive oil, if desired/needed 2-3 times/day until nipples are healed  You do not need to wash this off before pumping or feeding the baby  You may want to remove baby from breast when active swallowing stops to avoid prolonged nipple compression  Hydrogels recommended  Medela Hydrogels, Lansinoh Soothies and Ameda comfort gels are all the same, different companies  Silverettes (Knockoffs not recommended as they  may contain nickel which is allergenic)   Put a few drops of milk in the cup  Place over nipple, no lotions or creams  Allow bra to hold them in place  Once the wound is healed, there is no scabbing, may stop using as the silver can cause damage to the tissue and the milk in the cup can macerate the nipple. You can change to hydrogels or nipple butter.   Breast Care: Plugs (a colloquial term for microscopic ductal inflammation and narrowing) Inflammatory or infectious mastitis, breastpain including engorgement  or vasospasm  Breast rest - No Massage, don't overfeed of over pump, down regulate production if needed  Advil 800mg every 8 hours for 48 hours with food  Ice - 10 minutes  after feeding then every 30 minutes or as desired for repeating the ice. Don't put the ice directly on the skin.  May add Tylenol 1000mg every 8 hours for 48 hours in between the Advil dosing if needed for pain.  Answers to FAQs: https://www.infantrisk.com/category/breastfeeding  Alcohol & Breastfeeding: What's your time-to-zero?  Cough & Cold Medications while Breastfeeding  Vitamin D Supplementation and Breastfeeding  Antidepressant Use While Breastfeeding: What should I know?  Breastfeeding, Caffeine, and Energy Drinks  Recommended resources:  Physicians guide to breastfeeding, must up to date and accurate information available on breastfeeding. https://physicianguidetobreastfeeding.org/    Spoiler alert!  Parenting can be really hard. There is so much to learn when it comes to caring for small humans.  It can feel lonely and unsettling.  Our groups, are parenting and feeding support groups that's all about feeding/growing mauricio.   Babies welcome.   Questions expected.   We will help you find your village!   Connect with other mothers:  Facebook:   [place] Breast[facility]eds: https://www.facebook.com/[place].breastfeeds/  Well-Nourished Babies (Private group for questions and support): https://www.facebook.com/groups/302888246233311/  Online  Breastfeeding [place] Online Group  Thursdays, 12 noon - 1 pm Facilitated by Sivakumar Barba Link: [email or web address]   Passcode [id number]   [id number]  Breastfeeding [place] including opportunity to weight your baby and do before and after weights   Tuesdays 10am - 11am. Women's Health at [address] [place] [address], [address] [place] [address], 3rd floor conference room  Check your baby's weight, do a feeding and see how your baby is growing, visit with other mothers, plan on a walk or coffee date after group.  Please download Growth: Baby and Child for Apple or Child Growth Tracker for [employer] if you would like to  document and follow your baby's growth curve.  Due to space limitations - limit strollers please (New c/section moms please use your stroller).  We would love to have dads stay, but moms won't breastfeed if there are men in the room, sorry.  The room is generally scheduled for another event following group.  Please take all diapers with you   PSI ONLINE SUPPORT GROUPS  [employer]partum [employer] (PSI) support groups are conducted using a cszd-os-foep support model and are not intended for those experiencing a mental health crisis. Groups are 90 minutes (1.5 hours) in length. The first ~30 minutes is providing information, education, and establishing group guidelines. The next ~60 minutes is “talk time,” in which group members share and talk with each other. Group members must be present for the group guidelines before joining in the discussion or “talk time.”       In Conclusion:   Managing breastfeeding and milk supply is very dynamic,can be a complex and intimate journey, and is not one size fits all. When obstacles present themselves, it takes confidence, persistence and support. The rights of the child include optimal nutrition and mothers need help to make informed decisions. You  and your baby have been screened for biological, psychological,  and social risk factors that might interfere with achieving your goals.  Support is critical. We want the family thriving not just tolerating life. You are now the focus of our Breastfeeding Medicine team; we are here to support your decisions and vision.     Follow up   Please call [phone] our voicemail line or the front office at [phone] to scheduled your next appointment.  Family is encouraged to e-mail (for Sivakumar, may use [email or web address]) or [employer]  to update how the plan is working.     A total of 60 minutes, not including infant assessment time,  was spent preparing to see the patient, obtaining and reviewing separately obtained history, performing a medically appropriate examination and evaluation, counseling and educating the family, documenting clinical information in the electronic health record, independently interpreting weighted feeds and infant growth results, communicating these results to the family and care coordination as detailed in the above note.       FLASH Mandel.

## 2024-03-08 ENCOUNTER — TELEMEDICINE (OUTPATIENT)
Dept: OBGYN | Facility: CLINIC | Age: 34
End: 2024-03-08
Payer: COMMERCIAL

## 2024-03-08 DIAGNOSIS — Z91.89 AT RISK FOR POSTPARTUM DEPRESSION: ICD-10-CM

## 2024-03-08 DIAGNOSIS — O92.70 LACTATION PROBLEM: ICD-10-CM

## 2024-03-08 PROCEDURE — 99215 OFFICE O/P EST HI 40 MIN: CPT | Mod: 25 | Performed by: NURSE PRACTITIONER

## 2024-03-08 PROCEDURE — 99417 PROLNG OP E/M EACH 15 MIN: CPT | Mod: 76 | Performed by: NURSE PRACTITIONER

## 2024-03-08 NOTE — PROGRESS NOTES
Telemedicine: Established Patient   This evaluation was conducted via Zoom using secure and encrypted videoconferencing technology. The patient was in their home in the [facility].    The patient's identity was confirmed and verbal consent was obtained for this virtual visit.       Summary: Gte was seen in Urgent care at day 15, weighed with clothes on, and had a diagnosis of RSV,. At day 16, he is feeling better, a little congested. Christine has been pumping with the platinum after each breastfeeding every 3 hours up to  40% suction, but is having some nipple discomfort. She is practicing breastfeeding 1-2 times per day but unsure his ability to remove milk.  Her mom has gone home, dad had no paternity leave. Her goal is breastfeeding. She is now making sufficient supply  to see him gain an ounce per day on the [employer] scale. Her  left wrist is still painful, she is using the brace at night.  Today:Christine latched Get on the left breast and he was most content for 20 minutes, coming off on his own. She was able to do the right breast as well, he didn't stay as long but mom has mastered latch. There was pressure on the right but no pain on either side. We discussed realistic plans for making milk, feeding him and getting some rest.  She mastered one night without dad or her mom being available and feeling better but is open to speaking with a  therapist. Referral placed.   Plan: Continue with not breastfeeding at night, offering both breasts at a feeding 1-2x day.  Top off with the bottle after a feeding or a bottle instead of breastfeeding.  Pump after a breastfeeding to fully empty and evaluate Get's intake. Best if dad could bottle feed while you pump or set him in the boppy next to you to feed while you pump with hands free bra.   Follow up:   Lactation appointment:Encouraged zoom group, 1:1 to evaluate intake  Baby 's Provider appointment: 2 month well child  Referrals: None    Maternal  Diagnosis/Problem:  Lactation problem - feeding at the breast, Tender nipples,  At [facility] for PPD  Infant Diagnosis/Problem:   difficulties feeding at the breast     Subjective:     Christine Wilks is a 34 y.o. female here for lactation care. She is here on zoom today with her baby.  Concerns:   Maternal: Latch on difficulties , Nipple pain but less, more tender , Feeling that there is just  enough milk , Breastfeeding questions   Infant: Baby more interested but unsure volume removed  HPI:   Pertinent  history: c/section and primary 40.3 weeks    Mother does not have a history of advanced maternal age, GDM, hypertension prior to pregnancy, GHTN, insulin resistance, multiple gestation, PCOS, thyroid disease, auto immune disease , placenta encapsulation, and breast surgery    Breast changes in pregnancy: Yes by dad  Breast surgeries: No    FEEDING HISTORY:    Previous Breastfeeding History: First baby.   Hospital Course: Exclusively  with reported nipple damage.   Prior to consultation on 2024: Feeding all the time, reports breaks of 3-45 minutes between feedings. Offering both breasts, taking up to 1 hour to finish the feeding.   Prior to consultation on  2024   Christine has been pumping with the platinum after each breastfeeding every 3 hours and instead of breastfeeding, 8x/day at 22% suction. She is getting 20-30ml of milk at this time. Baby breastfeeds on each breast about 5 minutes, falls off often, has a difficulty time opening his mouth wide but is content to be there and intermittently suck. He is taking 60-80ml each feeding and has good interval growth. Her left nipple healed and the right became damage. Mom does use a brace at night for carpel tunnel syndrome, the left wrist is more painful.  Currently 3/8/2024 Get was seen in Urgent care at day 15, weighed with clothes on, and had a diagnosis of RSV,. At day 16, he is feeling better, a little congested. Christine  has been pumping with the platinum after each breastfeeding every 3 hours up to  40% suction, but is having some nipple discomfort. She is practicing breastfeeding 1-2 times per day but unsure his ability to remove milk.  Her mom has gone home, dad had no paternity leave. Her goal is breastfeeding. She is now making sufficient supply  to see him gain an ounce per day on the [employer] scale. Her  left wrist is still painful, she is using the brace at night.    Both breasts: Yes    Supplement: Expressed breast milk and and no longer formula    Breast Pumping:  Frequency: 8x/day  Quantity Obtained: 2oz +/-  Type of Pump: Platinum and Haakaa  Flange size/type: 25mm  NO pain with pumping    Maternal ROS:  Constitutional: No fever, chills. Feeling well  Breasts: No soreness of breasts and Soreness of nipples improving, more tender  Psychiatric: No mood changes  Mental Health: No mention of feeling irritable, agitated, angry, overwhelmed, apathetic, appetite changes, exhausted nor having sleep changes outside infant feeds/demands.  Current Outpatient Medications on File Prior to Visit   Medication Sig Dispense Refill    Misc. Devices (BREAST PUMP) Misc 1 1 Each 0    acetaminophen (TYLENOL) 500 MG Tab Take 2 Tablets by mouth every 6 hours as needed for Mild Pain. 30 Tablet 0    ibuprofen (MOTRIN) 800 MG Tab Take 1 Tablet by mouth every 8 hours as needed for Mild Pain. 30 Tablet 0    docusate sodium 100 MG Cap Take 100 mg by mouth 2 times a day as needed for Constipation. 60 Capsule 0    Prenatal Vit-Fe Fumarate-FA (PRENATAL 1 PLUS 1 PO) Take  by mouth.       No current facility-administered medications on file prior to visit.      No past medical history on file.    Objective:     Maternal Physical Lactation Exam  General: No acute distress  Breasts: Symmetrical , Soft, Plugged Duct - no evidence, and Mastitis  - no S/S  Nipples: no damage reported  Psychiatric: Normal mood and affect. Her behavior is normal. Judgment  and thought content normal.  Mental Health: Did NOT exhibit sadness, crying, feeling overwhelmed, agitation or hypervigilance.    Assessment/Plan & Lactation Counseling:     Infant Exam on Infant Chart    Infant Weight History:   2024: 8# 11oz  2024: 7# 10.1oz Group  24  8#2.3oz Peds  2024 8#7.5oz  24 8#10.1oz  3/2/24  weight with clothes on  3/8/2024 Virtual    Observed:   Initiation of Feeding: Infant initiates  Attachment Achieved: rapidly  Nipple shield: N/A       No Difficulty with  Latch   Suck Pattern on the Bottle: Not Indicated   Behavior Following Observed Feeding: searching for more  Nipple Pain From:pump suction    Latch: Independent  Suckling/Feeding: attaches, baby roots, elicits GODWIN, frequent pauses, loses suction, and rhythmic  Sucking strength: Moderate Strong  Sucking Rhythm Coordinated   Compression: WNL    Once latched, baby fell into a more but inconsistent mature and fully integrated SSB pattern.    Swallowing No difficulty noted  If functional feeding, it is quiet, rhythmic, coordinated, organized, effeicent safe, satisfying and pleasurable for both parent and baby?  No  Milk Supply Available: normal      MATERNAL PERSONALIZED BREASTFEEDING PLAN  (Babies and parents change and adapt  or mal-adapt, to each other, so a plan today is only as good as it facilitates the families goals, follow up is key in a dynamic process as breastfeeding.)  Discussed concerns and symptoms as listed above in assessment and guidance summarized below. Shared decision making was used between myself and the family for this encounter, home care, and follow up. Topics advised, taught and or reviewed included:   4th Trimester: The 12-week period immediately after mom has had the baby. Not everyone has heard of it, but every mother and their  baby will go through it. It is a time of great physical and emotional change as the baby adjusts to being outside the womb, and the family adjusts  "to new life as parents  During the fourth trimester, one can expect fussiness and crying from the baby and very likely exhaustion for the family.  babies are learning to adjust to life outside the womb where it was warm and squishy!  There is a lot of misinformation about babies and their needs, and parents are often encouraged to ignore baby's signals. Bad idea. Babies are “half-baked” at birth and have much to learn with the help of physical and emotional support from caregivers. Taking care of a baby's needs is an investment that pays off with a happier, healthier child and adult.  It can take weeks or even months for your body to feel totally normal again. There is a major hormonal upheaval experienced by moms in the first few weeks after birth, because their bodies are shifting from many pregnancy hormones to a more normal hormonal make-up.  These first three months with baby earth side is a delicate time. Honor it with a mindful dose of support. Mindful Mamma's is an argenis that may help.   Referral placed to  [facility]  Counseling for postpartum Emotional Wellness  [phone]  [email or web address]  Self-Compassion through Relational Support and Interaction.   Be kind to ourself. This is the first step in reducing anxiety and depression. Pay attention to how you are doing.   What do you need? Food, Rest, Sleep, Support, to Laugh/giggle: who will you ask today? They want to help you. We want to help you.   How do you stop your self-blame, or your self-criticism?   Get out of the house each day, walk or drive somewhere or ask a friend to drive you,  a \"treat\" at a drive through.   Mood and Anxiety Disorders: Having a new baby can be joyful time for some new moms, but a difficult time for others. For all, it is a time of profound physical and emotional change. Balancing baby, family, partners and friends, work, pets, and preexisting or new life stressors as " well as sleep deprivation can be extremely challenging. Untreated depression, sleep exhaustion, and anxiety are each a challenge that must be addressed and in addition can contribute to early cessation of breastfeeding. It is important both for the mental health and physical health of new moms and babies to get on the path to feeling better and if therapeutic support is needed, specific resources are available.   Triple Feeding and its sustainability and its impact on the mother baby relationship  Sleep or Lack of: Human Giver Syndrome (moms) tells us it’s “self-indulgent” to rest and sleep, which makes as much sense as believing it’s weak or self indulgent to breathe. We discussed strategies to manage restorative sleep, although short amounts, significant to the mental health of the mother. The principle follows:  Mom goes to sleep right after 8pm +/- feeding  Partner covers first late evening feeding (10pm/11pm), settles baby,  then goes to bed  Mom covers next feeding 1am /2am, partner sleeps  Next feeding share  Milk Supply is dependent on glandular tissue development, hormonal influences, how many times the baby removes milk and how well the breasts are emptied in a 24 hour period. This is a biological reality that we can NOT work around. If, for any reason, your baby is not latching, or you are not able to nurse, then it is important for you to remove the milk instead by pumping or hand expression.  There's no magic trick, tea, food, drink, cookie or supplement that will increase your milk supply. One  must  effectively remove milk to continue to make and maximize milk. In the early days and weeks that can be 8+ times in 24 hours. For older babies, on average 6-7 + times in 24 hours.   Feeding:   Infant feeding well given current interval growth, guidelines to follow:  Feed your baby every 1.5-3 hours, more often if baby acts hungry.   Awaken baby for feeding if going over 3 hours in the day.   Daily goal:  "8-12 feedings per 24 hours.    Given infants weight you may allow baby to go longer at night but that generally means shorter durations in the day.   Supplement:   Supplement with expressed milk  \"Double feeding\" as baby prepares for more robust feeding sessions  If triple feeing, FIRST decide what you can do at that feeding and if you decide to double feed -pump and bottle, that is sufficient.  If you are going to offer the breast at that feeding:  nurse first and limit time on the breasts to 20+/- min total  finish with bottle  pump afterwards to finish emptying your breasts which will help increase milk supply  As baby becomes more effective, evidenced by not pumping much milk after a breastfeeding, we will create a plan to decrease pumping.  Use your own pumped milk, donor human milk, or formula.  Pumping Guidelines:  Both breasts 8 times in 24 hours  Type of Pump:  Websites at [employer] or [employer] to see if you can use your insurance through them  Ameda [place] Settings http://www.Democracy.com/healthcare-professionals/videos/ameda-platinum-[email or web address]-hygienikit-video   Speed: Start at 80 then turn down to 60 after 1.5-2 minutes when you see milk in the flange channel  Suction: To comfort, goal of  31%. NEVER to discomfort.   Today you were at 40% which may be causing sore nipples.  Spectra Settings if you order this pump  Press letdown button when first starting         Cycle: 70 / Vacuum: L1 - L 5 (To comfort)  Once milk lets down, press letdown button again  Cycle: 54 / Vacuum: L1 - L12 (To comfort)  Power Pumping is only rarely indicated as the response is not significant or zero over 24 hours  How long will vary woman to woman, typically 8-15 minutes, or 1-2 minutes after flow slows  Flange Fit: Freely moving nipple in the tunnel with some movement of the areola.  Caution with Breast Massage: The word “Massage” means different things in the breastfeeding world. It is described and interpreted in so many different " ways and unfortunately potentially harmful. The hands can be safe on a breast and  gentle to help in many ways but they also can be damaging when used in the wrong way.  Lymphatic drainage massage is appropriate,  open hand , lightly stroking only, and can be highly effective. The massage advice to knead , massage deeply , vibrate with marketed tools is  most concerning. Be gentle with this gland to not increase inflammation.   Nipple care:    May apply breastmilk  Moist-oily ointment after feeding/pumping, ie Lanolin nipple butter, coconut or olive oil, if desired/needed 2-3 times/day until nipples are healed  You do not need to wash this off before pumping or feeding the baby  You may want to remove baby from breast when active swallowing stops to avoid prolonged nipple compression  Hydrogels recommended  Medela Hydrogels, Lansinoh Soothies and Ameda comfort gels are all the same, different companies  Spoiler alert!  Parenting can be really hard. There is so much to learn when it comes to caring for small humans.  It can feel lonely and unsettling.  Our groups, are parenting and feeding support groups that's all about feeding/growing mauricio.   Babies welcome.   Questions expected.   We will help you find your village!   Connect with other mothers:  Facebook:   [place] [facility]: [email or web address]  Well-Nourished Babies (Private group for questions and support): https://www.facebook.com/groups/685018563915139/  Online Breastfeeding [place] Online Group  Thursdays, 12 noon - 1 pm Facilitated by Sivakumar Barba Link: [email or web address]   Passcode [id number]   [phone]  Breastfeeding [place] including opportunity to weight your baby and do before and after weights   Tuesdays 10am - 11am. Women's Health at [address] [place] [address], [address] [place] [address], 3rd floor conference room  Check your baby's weight, do a feeding and see how  your baby is growing, visit with other mothers, plan on a walk or coffee date after group.  Please download Growth: Baby and Child for Apple or Child Growth Tracker for Androids if you would like to  document and follow your baby's growth curve.  Due to space limitations - limit strollers please (New c/section moms please use your stroller).  We would love to have dads stay, but moms won't breastfeed if there are men in the room, sorry.  The room is generally scheduled for another event following group.  Please take all diapers with you   PSI ONLINE SUPPORT GROUPS  [employer] (PSI) support groups are conducted using a xnsi-yw-bkki support model and are not intended for those experiencing a mental health crisis. Groups are 90 minutes (1.5 hours) in length. The first ~30 minutes is providing information, education, and establishing group guidelines. The next ~60 minutes is “talk time,” in which group members share and talk with each other. Group members must be present for the group guidelines before joining in the discussion or “talk time.”       Follow up   Please call [phone] our voicemail line or the front office at [phone] to scheduled your next appointment.  Family is encouraged to e-mail (for Sivakumar, may use [email or web address]) or [employer]  to update how the plan is working.     A total of 75 minutes,  was spent preparing to see the patient, obtaining and reviewing separately obtained history, performing a medically appropriate examination and evaluation, counseling and educating the family, documenting clinical information in the electronic health record, independently interpreting weighted feeds and infant growth results, communicating these results to the family and care coordination as detailed in the above note.       FLASH Mandel.

## 2024-03-20 ENCOUNTER — POST PARTUM (OUTPATIENT)
Dept: OBGYN | Facility: CLINIC | Age: 34
End: 2024-03-20
Payer: COMMERCIAL

## 2024-03-20 VITALS — DIASTOLIC BLOOD PRESSURE: 64 MMHG | WEIGHT: 158 LBS | BODY MASS INDEX: 28.9 KG/M2 | SYSTOLIC BLOOD PRESSURE: 115 MMHG

## 2024-03-20 DIAGNOSIS — Z30.011 OCP (ORAL CONTRACEPTIVE PILLS) INITIATION: ICD-10-CM

## 2024-03-20 PROCEDURE — 0503F POSTPARTUM CARE VISIT: CPT | Performed by: OBSTETRICS & GYNECOLOGY

## 2024-03-20 PROCEDURE — 3074F SYST BP LT 130 MM HG: CPT | Performed by: OBSTETRICS & GYNECOLOGY

## 2024-03-20 PROCEDURE — 3078F DIAST BP <80 MM HG: CPT | Performed by: OBSTETRICS & GYNECOLOGY

## 2024-03-20 RX ORDER — ACETAMINOPHEN AND CODEINE PHOSPHATE 120; 12 MG/5ML; MG/5ML
1 SOLUTION ORAL DAILY
Qty: 84 TABLET | Refills: 3 | Status: SHIPPED | OUTPATIENT
Start: 2024-03-20

## 2024-03-20 ASSESSMENT — EDINBURGH POSTNATAL DEPRESSION SCALE (EPDS)
I HAVE BLAMED MYSELF UNNECESSARILY WHEN THINGS WENT WRONG: NOT VERY OFTEN
I HAVE BEEN ABLE TO LAUGH AND SEE THE FUNNY SIDE OF THINGS: AS MUCH AS I ALWAYS COULD
I HAVE BEEN SO UNHAPPY THAT I HAVE HAD DIFFICULTY SLEEPING: NOT AT ALL
I HAVE LOOKED FORWARD WITH ENJOYMENT TO THINGS: RATHER LESS THAN I USED TO
I HAVE BEEN ANXIOUS OR WORRIED FOR NO GOOD REASON: HARDLY EVER
I HAVE FELT SAD OR MISERABLE: NOT VERY OFTEN
TOTAL SCORE: 7
THINGS HAVE BEEN GETTING ON TOP OF ME: NO, MOST OF THE TIME I HAVE COPED QUITE WELL
THE THOUGHT OF HARMING MYSELF HAS OCCURRED TO ME: NEVER
I HAVE FELT SCARED OR PANICKY FOR NO GOOD REASON: NO, NOT MUCH
I HAVE BEEN SO UNHAPPY THAT I HAVE BEEN CRYING: ONLY OCCASIONALLY

## 2024-03-20 NOTE — PROGRESS NOTES
POST PARTUM VISIT NOTE    SUBJECTIVE:  Christine Wilks is a 34 y.o.  who presents for postpartum exam.   I have reviewed the prenatal and intrapartum course.     Date of delivery :  2024  Type of delivery: Primary      ROS:  She feels well healed.   Laceration and/or Incision is well healed.   She is still having some bleeding.  She has been getting along better with her partner over the last week.  Reports that she is having some depression symptoms. She is seeing a therapist. She started her first session on Monday and she thinks it is helping. Feeling a little better over the last week. Her  is helping more at home.  She has not had sex.     Last pap date: 2023 neg     Delivery laceration(s): no  Feeding: pumping for baby   Postpartum course: uncomplicated  Contraceptive plans: desires to use Micronor    I have reviewed the patient's PMH for contraceptive risk factors, and she has no contraindications to contraception as planned.     OBJECTIVE:    Vitals:    24 0951   BP: 115/64       Appears well, vital signs normal.  Breast Exam: exam deferred.  Abdomen: soft, non tender, incision is healing well  Pelvic Exam: deferred    EPDS score:  7    ASSESSMENT:  Normal postpartum course    PLAN:   - Postpartum depression: Continue with counseling. Return in 2 weeks to discuss and see if it is progressing or getting better. Continue walks, eating healthy, and getting outside if possible.  - Encourage breast feeding: lactation prn  - Contraception planned:  Micronor    Discussed to take it the same time every single day. Recommend using back up contraception during at least the first month of use. Since Micronor is not as effective as combination OCPs, she can use condoms in addition during for best effectiveness. All questions answered.     Ashley Mansfield M.D.

## 2024-03-20 NOTE — PROGRESS NOTES
PT HERE FOR POSTPARTUM EXAM  BREAST FEEDING: pumping  DELIVERY DATE: 02/16/2024  DELIVERY TYPE: c section  DESIRED BCM: pill  LMP: unknown still bleeding  LAST PAP: 1 year ago dr isaac  PHONE # VERIFIED  PHARMACY VERIFIED  CC random incision pain, bilat wrist pain

## 2024-05-06 SDOH — HEALTH STABILITY: PHYSICAL HEALTH: ON AVERAGE, HOW MANY MINUTES DO YOU ENGAGE IN EXERCISE AT THIS LEVEL?: 30 MIN

## 2024-05-06 SDOH — HEALTH STABILITY: PHYSICAL HEALTH: ON AVERAGE, HOW MANY DAYS PER WEEK DO YOU ENGAGE IN MODERATE TO STRENUOUS EXERCISE (LIKE A BRISK WALK)?: 1 DAY

## 2024-05-06 SDOH — ECONOMIC STABILITY: HOUSING INSECURITY: IN THE LAST 12 MONTHS, HOW MANY PLACES HAVE YOU LIVED?: 1

## 2024-05-06 SDOH — ECONOMIC STABILITY: HOUSING INSECURITY
IN THE LAST 12 MONTHS, WAS THERE A TIME WHEN YOU DID NOT HAVE A STEADY PLACE TO SLEEP OR SLEPT IN A SHELTER (INCLUDING NOW)?: PATIENT DECLINED

## 2024-05-06 SDOH — ECONOMIC STABILITY: TRANSPORTATION INSECURITY
IN THE PAST 12 MONTHS, HAS THE LACK OF TRANSPORTATION KEPT YOU FROM MEDICAL APPOINTMENTS OR FROM GETTING MEDICATIONS?: PATIENT DECLINED

## 2024-05-06 SDOH — ECONOMIC STABILITY: TRANSPORTATION INSECURITY
IN THE PAST 12 MONTHS, HAS LACK OF RELIABLE TRANSPORTATION KEPT YOU FROM MEDICAL APPOINTMENTS, MEETINGS, WORK OR FROM GETTING THINGS NEEDED FOR DAILY LIVING?: PATIENT DECLINED

## 2024-05-06 SDOH — ECONOMIC STABILITY: INCOME INSECURITY: IN THE LAST 12 MONTHS, WAS THERE A TIME WHEN YOU WERE NOT ABLE TO PAY THE MORTGAGE OR RENT ON TIME?: PATIENT DECLINED

## 2024-05-06 SDOH — ECONOMIC STABILITY: TRANSPORTATION INSECURITY
IN THE PAST 12 MONTHS, HAS LACK OF TRANSPORTATION KEPT YOU FROM MEETINGS, WORK, OR FROM GETTING THINGS NEEDED FOR DAILY LIVING?: PATIENT DECLINED

## 2024-05-06 SDOH — HEALTH STABILITY: MENTAL HEALTH
STRESS IS WHEN SOMEONE FEELS TENSE, NERVOUS, ANXIOUS, OR CAN'T SLEEP AT NIGHT BECAUSE THEIR MIND IS TROUBLED. HOW STRESSED ARE YOU?: ONLY A LITTLE

## 2024-05-06 SDOH — ECONOMIC STABILITY: FOOD INSECURITY: WITHIN THE PAST 12 MONTHS, THE FOOD YOU BOUGHT JUST DIDN'T LAST AND YOU DIDN'T HAVE MONEY TO GET MORE.: PATIENT DECLINED

## 2024-05-06 SDOH — ECONOMIC STABILITY: FOOD INSECURITY: WITHIN THE PAST 12 MONTHS, YOU WORRIED THAT YOUR FOOD WOULD RUN OUT BEFORE YOU GOT MONEY TO BUY MORE.: PATIENT DECLINED

## 2024-05-06 SDOH — ECONOMIC STABILITY: INCOME INSECURITY: HOW HARD IS IT FOR YOU TO PAY FOR THE VERY BASICS LIKE FOOD, HOUSING, MEDICAL CARE, AND HEATING?: PATIENT DECLINED

## 2024-05-06 ASSESSMENT — SOCIAL DETERMINANTS OF HEALTH (SDOH)
HOW OFTEN DO YOU ATTEND CHURCH OR RELIGIOUS SERVICES?: MORE THAN 4 TIMES PER YEAR
DO YOU BELONG TO ANY CLUBS OR ORGANIZATIONS SUCH AS CHURCH GROUPS UNIONS, FRATERNAL OR ATHLETIC GROUPS, OR SCHOOL GROUPS?: YES
HOW OFTEN DO YOU HAVE A DRINK CONTAINING ALCOHOL: NEVER
DO YOU BELONG TO ANY CLUBS OR ORGANIZATIONS SUCH AS CHURCH GROUPS UNIONS, FRATERNAL OR ATHLETIC GROUPS, OR SCHOOL GROUPS?: YES
HOW MANY DRINKS CONTAINING ALCOHOL DO YOU HAVE ON A TYPICAL DAY WHEN YOU ARE DRINKING: PATIENT DOES NOT DRINK
HOW OFTEN DO YOU HAVE SIX OR MORE DRINKS ON ONE OCCASION: NEVER
HOW OFTEN DO YOU ATTENT MEETINGS OF THE CLUB OR ORGANIZATION YOU BELONG TO?: 1 TO 4 TIMES PER YEAR
HOW OFTEN DO YOU ATTEND CHURCH OR RELIGIOUS SERVICES?: MORE THAN 4 TIMES PER YEAR
HOW HARD IS IT FOR YOU TO PAY FOR THE VERY BASICS LIKE FOOD, HOUSING, MEDICAL CARE, AND HEATING?: PATIENT DECLINED
IN A TYPICAL WEEK, HOW MANY TIMES DO YOU TALK ON THE PHONE WITH FAMILY, FRIENDS, OR NEIGHBORS?: MORE THAN THREE TIMES A WEEK
HOW OFTEN DO YOU ATTENT MEETINGS OF THE CLUB OR ORGANIZATION YOU BELONG TO?: 1 TO 4 TIMES PER YEAR
IN A TYPICAL WEEK, HOW MANY TIMES DO YOU TALK ON THE PHONE WITH FAMILY, FRIENDS, OR NEIGHBORS?: MORE THAN THREE TIMES A WEEK
WITHIN THE PAST 12 MONTHS, YOU WORRIED THAT YOUR FOOD WOULD RUN OUT BEFORE YOU GOT THE MONEY TO BUY MORE: PATIENT DECLINED

## 2024-05-06 ASSESSMENT — LIFESTYLE VARIABLES
SKIP TO QUESTIONS 9-10: 1
HOW MANY STANDARD DRINKS CONTAINING ALCOHOL DO YOU HAVE ON A TYPICAL DAY: PATIENT DOES NOT DRINK
HOW OFTEN DO YOU HAVE SIX OR MORE DRINKS ON ONE OCCASION: NEVER
HOW OFTEN DO YOU HAVE A DRINK CONTAINING ALCOHOL: NEVER
AUDIT-C TOTAL SCORE: 0

## 2024-05-07 ENCOUNTER — APPOINTMENT (OUTPATIENT)
Dept: MEDICAL GROUP | Facility: IMAGING CENTER | Age: 34
End: 2024-05-07
Payer: COMMERCIAL

## 2024-05-07 VITALS
HEIGHT: 68 IN | OXYGEN SATURATION: 98 % | RESPIRATION RATE: 14 BRPM | DIASTOLIC BLOOD PRESSURE: 70 MMHG | BODY MASS INDEX: 23.34 KG/M2 | SYSTOLIC BLOOD PRESSURE: 112 MMHG | HEART RATE: 68 BPM | TEMPERATURE: 98.2 F | WEIGHT: 154 LBS

## 2024-05-07 DIAGNOSIS — Z13.6 SCREENING FOR CARDIOVASCULAR CONDITION: ICD-10-CM

## 2024-05-07 DIAGNOSIS — M65.4 TENOSYNOVITIS, DE QUERVAIN: ICD-10-CM

## 2024-05-07 DIAGNOSIS — Z11.59 NEED FOR HEPATITIS B SCREENING TEST: ICD-10-CM

## 2024-05-07 DIAGNOSIS — K59.09 OTHER CONSTIPATION: ICD-10-CM

## 2024-05-07 DIAGNOSIS — R79.89 ABNORMAL CBC: ICD-10-CM

## 2024-05-07 DIAGNOSIS — Z00.00 ROUTINE GENERAL MEDICAL EXAMINATION AT HEALTH CARE FACILITY: ICD-10-CM

## 2024-05-07 DIAGNOSIS — Z13.1 DIABETES MELLITUS SCREENING: ICD-10-CM

## 2024-05-07 PROCEDURE — 1126F AMNT PAIN NOTED NONE PRSNT: CPT | Performed by: INTERNAL MEDICINE

## 2024-05-07 PROCEDURE — 3074F SYST BP LT 130 MM HG: CPT | Performed by: INTERNAL MEDICINE

## 2024-05-07 PROCEDURE — 3078F DIAST BP <80 MM HG: CPT | Performed by: INTERNAL MEDICINE

## 2024-05-07 PROCEDURE — 99204 OFFICE O/P NEW MOD 45 MIN: CPT | Performed by: INTERNAL MEDICINE

## 2024-05-07 ASSESSMENT — PAIN SCALES - GENERAL: PAINLEVEL: NO PAIN

## 2024-05-07 NOTE — ASSESSMENT & PLAN NOTE
Constipation  Recommend adequate hydration (>64 oz of water/day), high fiber diet (fiber 35 gram/day), dietary fibers, and squatty potty.

## 2024-05-07 NOTE — ASSESSMENT & PLAN NOTE
Bilateral, left worse than right  Conservative treatment such as ice,  forearm-based thumb spica splint, topical Voltaren as needed, Tylenol as needed, cortisone injection, PT, and acupuncture.  She does not want cortisone injection due to concern of milk production.  A referral to an acupuncturist has been initiated.   Recommend to follow up with orthopedics if symptoms do not improve.

## 2024-05-07 NOTE — PROGRESS NOTES
New Patient to Establish    Reason to establish: New patient to establish    Christine Wilks is a 34 y.o. female who presents today with the following:    CC:   Chief Complaint   Patient presents with    Establish Care     Prior PCP- pt states none       Other     Recently had baby on 02/16     Wrist Pain     Left wrist pain recently visited [facility] for tendinitis was given a brace for her wrist and has fv scheduled tomorrow. Still experiencing pain no OTC medication         HPI:     Verbal consent was acquired by the patient to use [employer] ambient listening note generation during this visit Yes       History of Present Illness  The patient presents for evaluation of multiple medical concerns.    The patient was previously diagnosed with De Quervain's tendinitis in her bilateral wrists, with the left wrist being more affected than the right, albeit not severe. The wrist pain, which has been present since 01/2024, has been exacerbated by frequent use of her wrists due to her new mother and breastfeeding. Despite owning a splint for her left wrist, she finds it challenging to wear due to its impact on her movement. She sought treatment at [facility], where she was advised to wear a brace, apply ice, PT, and take Tylenol as needed. Although there has been a slight improvement, she expresses concern about the potential impact of the cortisone injection on her milk supply. She also seeks acupuncture as a treatment option.    The patient does not currently have a primary care physician. Her current medication regimen includes prenatal and lactation supplements, and occasionally uses a breast pump. She recently began seeing a gynecologist and has an appointment scheduled. She has no history of significant medical history, with the exception of a Caesarean section. She contracted chickenpox at the age of 4 but did not receive the chicken pox vaccine. She declined the Tdap vaccine. She has been experiencing constipation,  "with a bowel movement every other day, and occasionally experiences heartburn. Her diet is inconsistent in vegetables.   The patient denies smoking or vaping. She has 1 child. 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   Her mother has a history of breast cancer at the age of 69 and has high blood pressure. Her father had bilateral knee replacements in his late 60s. She has 2 sisters who are in good health but has anxiety. Her brother is  and had a stillbirth. Her maternal aunt had breast cancer and is . Her paternal aunt had breast cancer about 15 years ago. She denies any family history of colon cancer, ovarian cancer, tubal cancer, or BRCA gene mutations.    Problem   Tenosynovitis, De Quervain    Bilateral, left worse than right  Conservative treatment such as ice,  forearm-based thumb spica splint, topical Voltaren as needed, Tylenol as needed, cortisone injection, PT, and acupuncture.  She does not want cortisone injection due to concern of milk production.  A referral to an acupuncturist has been initiated.   Recommend to follow up with orthopedics if symptoms do not improve.       Other Constipation    Recommend adequate hydration (>64 oz of water/day), high fiber diet (fiber 35 gram/day), dietary fibers, and squatty potty.                 Current Outpatient Medications:     NON SPECIFIED, Lactation supplement, Disp: , Rfl:     Prenatal Vit-Fe Fumarate-FA (PRENATAL 1 PLUS 1 PO), Take  by mouth., Disp: , Rfl:     Misc. Devices (BREAST PUMP) Misc, 1 (Patient not taking: Reported on 2024), Disp: 1 Each, Rfl: 0    Allergies, past medical history, past surgical history, medications, family history, social history reviewed and updated.    ROS Please see HPI    Physical Exam  /70 (BP Location: Left arm, Patient Position: Sitting, BP Cuff Size: Adult)   Pulse 68   Temp 36.8 °C (98.2 °F) (Temporal)   Resp 14   Ht 1.727 m (5' 8\")   Wt 69.9 kg (154 lb)   LMP  (LMP Unknown)   SpO2 98%   " Breastfeeding Yes   BMI 23.42 kg/m²   Physical Exam  Constitutional:       Appearance: Normal appearance.   HENT:      Head: Normocephalic and atraumatic.      Right Ear: External ear normal.      Left Ear: External ear normal.      Nose: Nose normal.      Mouth/Throat:      Pharynx: Oropharynx is clear.   Cardiovascular:      Rate and Rhythm: Normal rate and regular rhythm.      Pulses: Normal pulses.   Pulmonary:      Effort: Pulmonary effort is normal.      Breath sounds: Normal breath sounds.   Abdominal:      General: Bowel sounds are normal.      Palpations: Abdomen is soft.      Tenderness: There is no abdominal tenderness.   Musculoskeletal:      Cervical back: Neck supple.      Right lower leg: No edema.      Left lower leg: No edema.      Comments:  Left Finkelstein maneuver positive   Skin:     General: Skin is warm and dry.   Neurological:      Mental Status: She is alert. Mental status is at baseline.   Psychiatric:         Mood and Affect: Mood normal.         Behavior: Behavior normal.         Thought Content: Thought content normal.         Judgment: Judgment normal.     4/10/24   4 view x-rays of the bilateral wrists including PA, oblique, lateral and   scaphoid show no obvious signs of fracture or dislocation.  Joint space   appears to be maintained throughout.  No osseous lesions and soft tissue   is unremarkable.     Assessment and Plan    1. Tenosynovitis, de Quervain  - Referral for Acupuncture  Bilateral, left worse than right  Conservative treatment such as ice,  forearm-based thumb spica splint, topical Voltaren as needed, Tylenol as needed, cortisone injection, PT, and acupuncture.  She does not want cortisone injection due to concern of milk production.  A referral to an acupuncturist has been initiated.   Recommend to follow up with orthopedics if symptoms do not improve.  2. Other constipation  - TSH WITH REFLEX TO FT4; Future  Recommend adequate hydration (>64 oz of water/day), high  fiber diet (fiber 35 gram/day), dietary fibers, and squatty potty.    3. Need for hepatitis B screening test  - HEP B SURFACE AB; Future  - HEP B SURFACE ANTIGEN; Future    4. Diabetes mellitus screening  - Comp Metabolic Panel; Future    5. Screening for cardiovascular condition  - Lipid Profile; Future    6. Abnormal CBC  - CBC WITH DIFFERENTIAL; Future    7. Routine general medical examination at [facility] care facility  - CBC WITH DIFFERENTIAL; Future  - Comp Metabolic Panel; Future  - Lipid Profile; Future  - TSH WITH REFLEX TO FT4; Future  - URINALYSIS,CULTURE IF INDICATED; Future      Follow-up:Return if symptoms worsen or fail to improve.    This note was created using voice recognition software. There may be unintended errors in spelling, grammar or content.

## 2024-07-09 ENCOUNTER — APPOINTMENT (OUTPATIENT)
Dept: MEDICAL GROUP | Facility: IMAGING CENTER | Age: 34
End: 2024-07-09
Payer: COMMERCIAL

## 2025-03-02 ENCOUNTER — RESULTS FOLLOW-UP (OUTPATIENT)
Dept: URGENT CARE | Facility: PHYSICIAN GROUP | Age: 35
End: 2025-03-02

## 2025-03-02 ENCOUNTER — OFFICE VISIT (OUTPATIENT)
Dept: URGENT CARE | Facility: PHYSICIAN GROUP | Age: 35
End: 2025-03-02
Payer: COMMERCIAL

## 2025-03-02 ENCOUNTER — APPOINTMENT (OUTPATIENT)
Dept: URGENT CARE | Facility: PHYSICIAN GROUP | Age: 35
End: 2025-03-02
Payer: COMMERCIAL

## 2025-03-02 VITALS
TEMPERATURE: 99.7 F | SYSTOLIC BLOOD PRESSURE: 110 MMHG | OXYGEN SATURATION: 97 % | HEART RATE: 97 BPM | DIASTOLIC BLOOD PRESSURE: 72 MMHG | RESPIRATION RATE: 14 BRPM | BODY MASS INDEX: 28.39 KG/M2 | WEIGHT: 144.6 LBS | HEIGHT: 60 IN

## 2025-03-02 DIAGNOSIS — N61.0 MASTITIS: ICD-10-CM

## 2025-03-02 DIAGNOSIS — R50.9 FEVER, UNSPECIFIED FEVER CAUSE: ICD-10-CM

## 2025-03-02 LAB
FLUAV RNA SPEC QL NAA+PROBE: NEGATIVE
FLUBV RNA SPEC QL NAA+PROBE: NEGATIVE
RSV RNA SPEC QL NAA+PROBE: NEGATIVE
SARS-COV-2 RNA RESP QL NAA+PROBE: NEGATIVE

## 2025-03-02 PROCEDURE — 3074F SYST BP LT 130 MM HG: CPT

## 2025-03-02 PROCEDURE — 3078F DIAST BP <80 MM HG: CPT

## 2025-03-02 PROCEDURE — 99203 OFFICE O/P NEW LOW 30 MIN: CPT

## 2025-03-02 PROCEDURE — 0241U POCT CEPHEID COV-2, FLU A/B, RSV - PCR: CPT

## 2025-03-02 RX ORDER — DICLOXACILLIN SODIUM 250 MG/1
500 CAPSULE ORAL 4 TIMES DAILY
Qty: 56 CAPSULE | Refills: 0 | Status: SHIPPED | OUTPATIENT
Start: 2025-03-02 | End: 2025-03-09

## 2025-03-03 NOTE — PROGRESS NOTES
Chief Complaint   Patient presents with    Fever     X today     Breast Pain     Right side, tender, more prominent veins, currently breastfeeding   X 2 days         Subjective:   HISTORY OF PRESENT ILLNESS: Christine Wilks is a 35 y.o. female who presents for fever and right sided breast pain x 2 days.  She is breast feeding her 12 month old.  No other flu like symptoms       Medications, Allergies, current problem list, Social and Family history reviewed today in Epic.     Objective:     /72   Pulse 97   Temp 37.6 °C (99.7 °F) (Temporal)   Resp 14   Ht 1.524 m (5')   Wt 65.6 kg (144 lb 9.6 oz)   SpO2 97%     Physical Exam  Vitals reviewed.   Constitutional:       Appearance: Normal appearance.   HENT:      Mouth/Throat:      Mouth: Mucous membranes are moist.   Cardiovascular:      Rate and Rhythm: Normal rate.   Pulmonary:      Effort: Pulmonary effort is normal.   Chest:          Comments: Tenderness to lateral right breast but with no erythema, lumps or masses or skin changes  Skin:     General: Skin is warm and dry.   Neurological:      Mental Status: She is alert and oriented to person, place, and time.   Psychiatric:         Mood and Affect: Mood normal.          Assessment/Plan:     Diagnosis and associated orders    I personally reviewed prior external notes and test results pertinent to today's visit.     1. Mastitis  dicloxacillin (DYNAPEN) 250 MG Cap      2. Fever, unspecified fever cause  POCT CoV-2, Flu A/B, RSV by PCR            IMPRESSION: The patient is well appearing here with reassuring exam and vitals signs. Question of whether this is truly mastitis, there is not erythema, warmth to the area but she is tenderness.  Viral swab was negative.  Advised to watch her symptoms for 48 hours, begin antibiotics  for any worsening pain or redness to the area or if her fever does not resolve in 48 hours.   Discussed anticipated duration of illness, return to work/school, and indications  to RTC or go to the Emergency department.    Patient states understanding of the plan of care and discharge instructions.  They are discharged in stable condition.         Please note that this dictation was created using voice recognition software. I have made a reasonable attempt to correct obvious errors, but I expect that there are errors of grammar and possibly content that I did not discover before finalizing the note.    This note was electronically signed by SAVANA Chaparro

## (undated) DEVICE — TRAY SPINAL ANESTHESIA NON-SAFETY (10/CA)

## (undated) DEVICE — SPONGE GAUZESTER 4 X 4 4PLY - (128PK/CA)

## (undated) DEVICE — ELECTRODE DUAL RETURN W/ CORD - (50/PK)

## (undated) DEVICE — PENCIL ELECTSURG 10FT HLSTR - WITH BLADE (50EA/CA)

## (undated) DEVICE — KIT  I.V. START (100EA/CA)

## (undated) DEVICE — SODIUM CHL IRRIGATION 0.9% 1000ML (12EA/CA)

## (undated) DEVICE — SUTURE 0 VICRYL PLUS CT-1 - 36 INCH (36/BX)

## (undated) DEVICE — SET EXTENSION WITH 2 PORTS (48EA/CA) ***PART #2C8610 IS A SUBSTITUTE*****

## (undated) DEVICE — BAG SPONGE COUNT 10.25 X 32 - BLUE (250/CA)

## (undated) DEVICE — PACK C-SECTION (2EA/CA)

## (undated) DEVICE — PACK ROOM TURNOVER L&D (12/CA)

## (undated) DEVICE — STAPLER SKIN DISP - (6/BX 10BX/CA) VISISTAT

## (undated) DEVICE — PAD LAP STERILE 18 X 18 - (5/PK 40PK/CA)

## (undated) DEVICE — WATER IRRIGATION STERILE 1000ML (12EA/CA)

## (undated) DEVICE — TAPE CLOTH MEDIPORE 6 INCH - (12RL/CA)

## (undated) DEVICE — DRESSING NON-ADHERING 8 X 3 - (50/BX)

## (undated) DEVICE — TUBING CLEARLINK DUO-VENT - C-FLO (48EA/CA)

## (undated) DEVICE — SOLUTION PLASMA-LYTE PH 7.4 INJ 1000ML  (14EA/CA)

## (undated) DEVICE — GLOVE, BIOGEL ECLIPSE, SZ 7.0, PF LTX (50/BX)

## (undated) DEVICE — GLOVE BIOGEL SZ 7 SURGICAL PF LTX - (50PR/BX 4BX/CA)

## (undated) DEVICE — HEAD HOLDER JUNIOR/ADULT

## (undated) DEVICE — CATHETER IV NON-SAFETY 18 GA X 1 1/4 (50/BX 4BX/CA)